# Patient Record
Sex: FEMALE | Race: WHITE | NOT HISPANIC OR LATINO | Employment: OTHER | URBAN - METROPOLITAN AREA
[De-identification: names, ages, dates, MRNs, and addresses within clinical notes are randomized per-mention and may not be internally consistent; named-entity substitution may affect disease eponyms.]

---

## 2017-01-06 DIAGNOSIS — E11.9 TYPE 2 DIABETES MELLITUS WITHOUT COMPLICATION: ICD-10-CM

## 2017-01-13 ENCOUNTER — TELEPHONE (OUTPATIENT)
Dept: OPHTHALMOLOGY | Facility: CLINIC | Age: 82
End: 2017-01-13

## 2017-01-13 NOTE — TELEPHONE ENCOUNTER
----- Message from Sary Ruvalcaba sent at 1/13/2017  1:55 PM CST -----  Contact: Pt  Pt has some questions for the nurse regarding her upcoming appt. Pls call pt back at [phone].

## 2017-01-20 DIAGNOSIS — E11.9 TYPE 2 DIABETES MELLITUS WITHOUT COMPLICATION: ICD-10-CM

## 2017-01-26 ENCOUNTER — TELEPHONE (OUTPATIENT)
Dept: OPHTHALMOLOGY | Facility: CLINIC | Age: 82
End: 2017-01-26

## 2017-01-26 NOTE — TELEPHONE ENCOUNTER
I called ms. Nidhi this morning and let it ring over 25 times with no answer or no voicemail.  I will try her back again a little bit later on. KF

## 2017-01-26 NOTE — TELEPHONE ENCOUNTER
Spoke to patient, she wanted to know if she would have a patch on her eye after having lid lesion removed. She wants to drive herself to her appt.Told her that she would likely have a patch. She will bring a .

## 2017-01-26 NOTE — TELEPHONE ENCOUNTER
----- Message from Maureen Long sent at 1/26/2017 10:05 AM CST -----  Contact: pt  Would like to speak with nurse staff again pt states she is schedule for surgery she forgot to ask a questions pls call back today [phone]

## 2017-01-27 ENCOUNTER — PROCEDURE VISIT (OUTPATIENT)
Dept: OPHTHALMOLOGY | Facility: CLINIC | Age: 82
End: 2017-01-27
Payer: MEDICARE

## 2017-01-27 DIAGNOSIS — L82.1 KERATOSIS SEBORRHEICA: ICD-10-CM

## 2017-01-27 DIAGNOSIS — H25.11 NUCLEAR SCLEROTIC CATARACT OF RIGHT EYE: Primary | ICD-10-CM

## 2017-01-27 DIAGNOSIS — H35.30 MACULAR DEGENERATION (SENILE) OF RETINA: ICD-10-CM

## 2017-01-27 DIAGNOSIS — H25.12 NUCLEAR SCLEROTIC CATARACT OF LEFT EYE: ICD-10-CM

## 2017-01-27 DIAGNOSIS — H40.1132 PRIMARY OPEN ANGLE GLAUCOMA OF BOTH EYES, MODERATE STAGE: ICD-10-CM

## 2017-01-27 PROCEDURE — 99499 UNLISTED E&M SERVICE: CPT | Mod: S$GLB,,, | Performed by: OPHTHALMOLOGY

## 2017-01-27 PROCEDURE — 92136 OPHTHALMIC BIOMETRY: CPT | Mod: 26,RT,S$GLB, | Performed by: OPHTHALMOLOGY

## 2017-01-27 RX ORDER — BRIMONIDINE TARTRATE AND TIMOLOL MALEATE 2; 5 MG/ML; MG/ML
SOLUTION OPHTHALMIC
Qty: 10 ML | Refills: 6 | Status: SHIPPED | OUTPATIENT
Start: 2017-01-27 | End: 2017-12-15 | Stop reason: SDUPTHER

## 2017-01-27 RX ORDER — LATANOPROST 50 UG/ML
SOLUTION/ DROPS OPHTHALMIC
Qty: 2.5 ML | Refills: 12 | Status: SHIPPED | OUTPATIENT
Start: 2017-01-27 | End: 2017-12-15 | Stop reason: SDUPTHER

## 2017-01-27 RX ORDER — POLYMYXIN B SULFATE AND TRIMETHOPRIM 1; 10000 MG/ML; [USP'U]/ML
1 SOLUTION OPHTHALMIC 4 TIMES DAILY
Qty: 1 BOTTLE | Refills: 1 | Status: SHIPPED | OUTPATIENT
Start: 2017-01-27 | End: 2017-02-06

## 2017-01-27 RX ORDER — DIFLUPREDNATE OPHTHALMIC 0.5 MG/ML
1 EMULSION OPHTHALMIC 4 TIMES DAILY
Qty: 1 BOTTLE | Refills: 0 | Status: SHIPPED | OUTPATIENT
Start: 2017-01-27 | End: 2017-05-01 | Stop reason: SDUPTHER

## 2017-01-27 NOTE — PROGRESS NOTES
HPI     Patient is here for EFRAIN bui  NS OU  DRY AMD  ERM OS      Latanaprost QHS OU  Combigan BID OU        COAG  NS OU  DRY AMD  ERM OS  Latanaprost QHS OU  Combigan BID OU       Last edited by XU Wang on 1/27/2017  2:01 PM.         Assessment /Plan     For exam results, see Encounter Report.      ICD-10-CM ICD-9-CM    1. Nuclear sclerotic cataract of right eye H25.11 366.16 Visually Significant Cataract OD > OS  Patient reports decreased vision consistent with the clinical amount of lenticular opacity, which reaches the level of visual significance and affects activities of daily living including reading and glare. Risks, benefits, and alternatives to cataract surgery were discussed.   The pt expressed a desire to proceed with surgery with the potential for some reasonable degree of visual improvement.    Discussed IOL options and refractive outcomes for this patient.    Phaco right eye, with ORA / Istent- Goniotomy   Block  toric IOL.  Will aim for distance  Referral to [facility] for Ophthalmic surgery  Prescriptions sent for preoperative medications  Durezol, Polytrim, and Ilevro  Explained that patient may need glasses after surgery.  Discussed that vision may be limited by retina       IOL Master - OS - Left Eye      Ambulatory Referral to External Surgery      polymyxin B sulf-trimethoprim (POLYTRIM) 10,000 unit- 1 mg/mL Drop      nepafenac (ILEVRO) 0.3 % DrpS      difluprednate (DUREZOL) 0.05 % Drop ophthalmic solution   2. Primary open angle glaucoma of both eyes, moderate stage H40.1132 365.11 Discussed with patient treatment options for glaucoma in conjunction with cataract surgery. Patient is currently treating glaucoma with topical medications and reports significant complaints regarding the tolerability of the medications with respect to cost and irritation. Specifically, the patient complains of redness, irritation, chronic discomfort as well as a financial burden  associated with the use of glaucoma medications. Discussed treatment options including ECP, filtering procedures, iStent, canaloplasty, Goniotomy or the continued use of glaucoma medications. Patient desires the undergo istent/ goniotomy  in conjunction with cataract surgery in order to reduce dependence of glaucoma medications.      brimonidine-timolol (COMBIGAN) 0.2-0.5 % Drop     365.72 latanoprost 0.005 % ophthalmic solution   3. Nuclear sclerotic cataract of left eye H25.12 366.16 IOL Master - OS - Left Eye      Ambulatory Referral to External Surgery      polymyxin B sulf-trimethoprim (POLYTRIM) 10,000 unit- 1 mg/mL Drop      nepafenac (ILEVRO) 0.3 % DrpS      difluprednate (DUREZOL) 0.05 % Drop ophthalmic solution   4. Keratosis seborrheica L82.1 702.19 Right eye, will follow for now- will address cataract first    5. Macular degeneration (senile) of retina H35.30 362.50 Follow -  Appears stable

## 2017-01-30 ENCOUNTER — TELEPHONE (OUTPATIENT)
Dept: OPHTHALMOLOGY | Facility: CLINIC | Age: 82
End: 2017-01-30

## 2017-01-30 NOTE — TELEPHONE ENCOUNTER
----- Message from Jenna Guidry sent at 1/30/2017  4:04 PM CST -----  Contact: Patient  Patient called to speak with the nurse; she can be contacted at [phone].    Thanks,  Jenna

## 2017-01-31 NOTE — TELEPHONE ENCOUNTER
----- Message from Eri Whitt sent at 1/30/2017  4:44 PM CST -----  Contact: Pt   Pt called and stated she needed to speak to the nurse. She stated she needs clarity on her  Procedure date. She can be reached at [phone].    Thanks,  TF

## 2017-02-01 ENCOUNTER — TELEPHONE (OUTPATIENT)
Dept: OPHTHALMOLOGY | Facility: CLINIC | Age: 82
End: 2017-02-01

## 2017-02-01 NOTE — TELEPHONE ENCOUNTER
----- Message from Maureen Long sent at 2/1/2017  2:26 PM CST -----  Contact: pt  Had a couple of questions before starting drops did not know name of drops would like to be called back asap states she will be leaving home in about 15min [phone]

## 2017-02-02 ENCOUNTER — PATIENT OUTREACH (OUTPATIENT)
Dept: ADMINISTRATIVE | Facility: HOSPITAL | Age: 82
End: 2017-02-02

## 2017-02-02 NOTE — LETTER
February 2, 2017    Martha Terrell  [address]  [address]  [place] [address]             [facility]  [address]  [facility] [address]  Phone: [phone] Dear Ms. Terrell:    [facility] is committed to your overall health.  To help you get the most out of each of your visits, we will review your information to make sure you are up to date on all of your recommended tests and/or procedures.      Nichelle Cruz MD has found that you may be due for    Lipid Panel     DEXA SCAN     Hemoglobin A1c     Urine Microalbumin     Foot Exam         If you have had any of the above done at another facility, please bring the records or information with you so that your record at [facility] will be complete.    If you are currently taking medication, please bring it with you to your appointment for review.    We will be happy to assist you with scheduling any necessary appointments or you may contact the [facility] appointment desk at [phone] to schedule at your convenience.     Thank you for choosing [facility] for your healthcare needs,      ROSE MARIE Mclean  Care Coordination Department  [facility]

## 2017-02-07 ENCOUNTER — TELEPHONE (OUTPATIENT)
Dept: OPHTHALMOLOGY | Facility: CLINIC | Age: 82
End: 2017-02-07

## 2017-02-07 NOTE — TELEPHONE ENCOUNTER
----- Message from Maureen Long sent at 2/6/2017  2:11 PM CST -----  Contact: pt  States that she would like to speak to someone regarding if her ins will cover surgery. Please call back at [phone]//thank you

## 2017-02-08 ENCOUNTER — LAB VISIT (OUTPATIENT)
Dept: LAB | Facility: HOSPITAL | Age: 82
End: 2017-02-08
Attending: INTERNAL MEDICINE
Payer: MEDICARE

## 2017-02-08 ENCOUNTER — OFFICE VISIT (OUTPATIENT)
Dept: INTERNAL MEDICINE | Facility: CLINIC | Age: 82
End: 2017-02-08
Payer: MEDICARE

## 2017-02-08 VITALS
OXYGEN SATURATION: 95 % | TEMPERATURE: 97 F | SYSTOLIC BLOOD PRESSURE: 112 MMHG | WEIGHT: 104.94 LBS | BODY MASS INDEX: 19.81 KG/M2 | HEART RATE: 70 BPM | HEIGHT: 61 IN | DIASTOLIC BLOOD PRESSURE: 68 MMHG

## 2017-02-08 DIAGNOSIS — E11.9 CONTROLLED TYPE 2 DIABETES MELLITUS WITHOUT COMPLICATION, UNSPECIFIED LONG TERM INSULIN USE STATUS: Chronic | ICD-10-CM

## 2017-02-08 DIAGNOSIS — E78.5 HYPERLIPIDEMIA ASSOCIATED WITH TYPE 2 DIABETES MELLITUS: ICD-10-CM

## 2017-02-08 DIAGNOSIS — E11.69 HYPERLIPIDEMIA ASSOCIATED WITH TYPE 2 DIABETES MELLITUS: ICD-10-CM

## 2017-02-08 DIAGNOSIS — R35.0 URINARY FREQUENCY: ICD-10-CM

## 2017-02-08 DIAGNOSIS — I11.0 HYPERTENSIVE HEART DISEASE WITH HEART FAILURE: Chronic | ICD-10-CM

## 2017-02-08 DIAGNOSIS — Z00.00 ENCOUNTER FOR PREVENTIVE HEALTH EXAMINATION: ICD-10-CM

## 2017-02-08 DIAGNOSIS — M81.0 OP (OSTEOPOROSIS): ICD-10-CM

## 2017-02-08 DIAGNOSIS — F41.8 MIXED ANXIETY AND DEPRESSIVE DISORDER: ICD-10-CM

## 2017-02-08 DIAGNOSIS — R19.7 DIARRHEA, UNSPECIFIED TYPE: ICD-10-CM

## 2017-02-08 DIAGNOSIS — K59.09 OTHER CONSTIPATION: ICD-10-CM

## 2017-02-08 DIAGNOSIS — M81.0 OP (OSTEOPOROSIS): Primary | ICD-10-CM

## 2017-02-08 DIAGNOSIS — I70.0 ATHEROSCLEROSIS OF AORTA: ICD-10-CM

## 2017-02-08 LAB
25(OH)D3+25(OH)D2 SERPL-MCNC: 45 NG/ML
ALBUMIN SERPL BCP-MCNC: 3.8 G/DL
ALP SERPL-CCNC: 64 U/L
ALT SERPL W/O P-5'-P-CCNC: 18 U/L
ANION GAP SERPL CALC-SCNC: 7 MMOL/L
AST SERPL-CCNC: 21 U/L
BASOPHILS # BLD AUTO: 0.04 K/UL
BASOPHILS NFR BLD: 0.5 %
BILIRUB SERPL-MCNC: 0.5 MG/DL
BUN SERPL-MCNC: 18 MG/DL
CALCIUM SERPL-MCNC: 9.3 MG/DL
CHLORIDE SERPL-SCNC: 99 MMOL/L
CO2 SERPL-SCNC: 29 MMOL/L
CREAT SERPL-MCNC: 0.8 MG/DL
DIFFERENTIAL METHOD: ABNORMAL
EOSINOPHIL # BLD AUTO: 0.1 K/UL
EOSINOPHIL NFR BLD: 1.8 %
ERYTHROCYTE [DISTWIDTH] IN BLOOD BY AUTOMATED COUNT: 14.9 %
EST. GFR  (AFRICAN AMERICAN): >60 ML/MIN/1.73 M^2
EST. GFR  (NON AFRICAN AMERICAN): >60 ML/MIN/1.73 M^2
GLUCOSE SERPL-MCNC: 106 MG/DL
HCT VFR BLD AUTO: 39.9 %
HGB BLD-MCNC: 12.8 G/DL
LYMPHOCYTES # BLD AUTO: 1.7 K/UL
LYMPHOCYTES NFR BLD: 21 %
MCH RBC QN AUTO: 29.9 PG
MCHC RBC AUTO-ENTMCNC: 32.1 %
MCV RBC AUTO: 93 FL
MONOCYTES # BLD AUTO: 0.6 K/UL
MONOCYTES NFR BLD: 7.1 %
NEUTROPHILS # BLD AUTO: 5.4 K/UL
NEUTROPHILS NFR BLD: 69 %
PLATELET # BLD AUTO: 235 K/UL
PMV BLD AUTO: 10.9 FL
POTASSIUM SERPL-SCNC: 4.3 MMOL/L
PROT SERPL-MCNC: 7.2 G/DL
RBC # BLD AUTO: 4.28 M/UL
SODIUM SERPL-SCNC: 135 MMOL/L
TSH SERPL DL<=0.005 MIU/L-ACNC: 1.18 UIU/ML
WBC # BLD AUTO: 7.87 K/UL

## 2017-02-08 PROCEDURE — 99999 PR PBB SHADOW E&M-EST. PATIENT-LVL III: CPT | Mod: PBBFAC,,, | Performed by: INTERNAL MEDICINE

## 2017-02-08 PROCEDURE — 99499 UNLISTED E&M SERVICE: CPT | Mod: S$GLB,,, | Performed by: INTERNAL MEDICINE

## 2017-02-08 PROCEDURE — 36415 COLL VENOUS BLD VENIPUNCTURE: CPT | Mod: PO

## 2017-02-08 PROCEDURE — 84443 ASSAY THYROID STIM HORMONE: CPT

## 2017-02-08 PROCEDURE — 85025 COMPLETE CBC W/AUTO DIFF WBC: CPT

## 2017-02-08 PROCEDURE — 82306 VITAMIN D 25 HYDROXY: CPT

## 2017-02-08 PROCEDURE — 99397 PER PM REEVAL EST PAT 65+ YR: CPT | Mod: S$GLB,,, | Performed by: INTERNAL MEDICINE

## 2017-02-08 PROCEDURE — 83036 HEMOGLOBIN GLYCOSYLATED A1C: CPT

## 2017-02-08 PROCEDURE — 80053 COMPREHEN METABOLIC PANEL: CPT

## 2017-02-08 RX ORDER — HYDROXYZINE HYDROCHLORIDE 10 MG/1
10-20 TABLET, FILM COATED ORAL 3 TIMES DAILY PRN
Qty: 40 TABLET | Refills: 3 | Status: SHIPPED | OUTPATIENT
Start: 2017-02-08 | End: 2018-03-29

## 2017-02-08 RX ORDER — HYDROXYZINE HYDROCHLORIDE 10 MG/1
10-20 TABLET, FILM COATED ORAL 3 TIMES DAILY PRN
Qty: 40 TABLET | Refills: 3 | Status: SHIPPED | OUTPATIENT
Start: 2017-02-08 | End: 2017-02-08 | Stop reason: SDUPTHER

## 2017-02-08 NOTE — PROGRESS NOTES
"Subjective:       Patient ID: Martha Terrell is a 89 y.o. female.    Chief Complaint: Annual Exam    HPI Comments: Here for f/u medical problems and preventive exam.  Tolerating sertraline 75mg and thinks helping her.  Urine frequency at night, causes her not to sleep much.  Detrol is not helping.  Constipation alt with diarrhea.  No black or blood in stool.  No f/c/sw.  Having some throat tickle and coughing.  Wants allergy pill.  No exertional cp/sob/palp.  Taking vit D.  No dysuria.  Wants a pill for occasional excess anxiety feeling.        HM: 10/16 fluvax, 5/15 [id number], 10/16 booster [id number], 10/13 TDaP, 2/12 zoster, 4/13 BMD, 4/06 Cscope no repeat.        Review of Systems   Constitutional: Negative for appetite change, chills, diaphoresis and fever.   HENT: Negative for congestion, ear pain, rhinorrhea, sinus pressure and sore throat.    Respiratory: Negative for cough, chest tightness and shortness of breath.    Cardiovascular: Negative for chest pain and palpitations.   Gastrointestinal: Positive for constipation and diarrhea. Negative for blood in stool, nausea and vomiting.   Genitourinary: Positive for frequency. Negative for dysuria, hematuria, menstrual problem, urgency and vaginal discharge.   Musculoskeletal: Negative for arthralgias.   Skin: Negative for rash.   Neurological: Negative for dizziness and headaches.   Psychiatric/Behavioral: Negative for sleep disturbance. The patient is not nervous/anxious.        Objective:     Visit Vitals    /68 (BP Location: Right arm)    Pulse 70    Temp 96.7 °F (35.9 °C) (Tympanic)    Ht 5' 1" (1.549 m)    Wt 47.6 kg (104 lb 15 oz)    SpO2 95%    BMI 19.83 kg/m2       Physical Exam   Constitutional: She is oriented to person, place, and time. She appears well-developed and well-nourished.   HENT:   Right Ear: External ear normal. Tympanic membrane is not injected.   Left Ear: External ear normal. Tympanic membrane is not injected.   Mouth/Throat: " Oropharynx is clear and moist.   Eyes: Conjunctivae are normal.   Neck: Normal range of motion. Neck supple. No thyromegaly present.   Cardiovascular: Normal rate, regular rhythm and intact distal pulses.  Exam reveals no gallop and no friction rub.    No murmur heard.  Pulses:       Dorsalis pedis pulses are 2+ on the right side, and 2+ on the left side.        Posterior tibial pulses are 2+ on the right side, and 2+ on the left side.   Pulmonary/Chest: Effort normal and breath sounds normal. She has no wheezes. She has no rales.   Abdominal: Soft. Bowel sounds are normal. She exhibits no mass. There is no tenderness.   Musculoskeletal: She exhibits no edema.   Feet:   Right Foot:   Protective Sensation: 10 sites tested. 10 sites sensed.   Skin Integrity: Negative for ulcer, blister, skin breakdown, erythema, warmth, callus or dry skin.   Left Foot:   Protective Sensation: 10 sites tested. 10 sites sensed.   Skin Integrity: Negative for ulcer, blister, skin breakdown, erythema, warmth, callus or dry skin.   Lymphadenopathy:     She has no cervical adenopathy.   Neurological: She is alert and oriented to person, place, and time.   Skin: Skin is warm. No rash noted.   Psychiatric: She has a normal mood and affect.       Assessment:       1. OP (osteoporosis)    2. Mixed anxiety and depressive disorder    3. Hypertensive heart disease with heart failure    4. Hyperlipidemia associated with type 2 diabetes mellitus    5. Controlled type 2 diabetes mellitus without complication, unspecified long term insulin use status    6. Atherosclerosis of aorta    7. Encounter for preventive health examination    8. Urinary frequency    9. Other constipation    10. Diarrhea, unspecified type        Plan:       Martha was seen today for annual exam.    Diagnoses and all orders for this visit:    OP (osteoporosis)  -     Vitamin D; Future    Mixed anxiety and depressive disorder- cont zoloft.  Add hydroxyzine prn.    Hypertensive  heart disease with heart failure- clin stable.    Hyperlipidemia associated with type 2 diabetes mellitus- too old to start statin.    Controlled type 2 diabetes mellitus without complication, unspecified long term insulin use status- check lab.  -     Hemoglobin A1c; Future  -     Microalbumin/creatinine urine ratio; Future    Atherosclerosis of aorta due to age.    Encounter for preventive health examination- lab now with [place].  -     Comprehensive metabolic panel; Future  -     CBC auto differential; Future  -     TSH; Future  -     Vitamin D; Future  -     Hemoglobin A1c; Future  -     Microalbumin/creatinine urine ratio; Future    Urinary frequency, not better with detrol  -     Ambulatory consult to Urology  -     Urinalysis; Future    Other constipation and Diarrhea, unspecified type  -     Ambulatory consult to Gastroenterology

## 2017-02-08 NOTE — MR AVS SNAPSHOT
[facility]  [address] [facility] [place] [address]  Phone: [phone]  Fax: [phone]                  Martha Terrell   2017 11:00 AM   Office Visit    Description:  Female : 1921   Provider:  Nichelle Lugo MD   Department:  [facility] - Internal Medicine           Reason for Visit     Annual Exam           Diagnoses this Visit        Comments    OP (osteoporosis)    -  Primary     Mixed anxiety and depressive disorder         Hypertensive heart disease with heart failure         Hyperlipidemia associated with type 2 diabetes mellitus         Controlled type 2 diabetes mellitus without complication, unspecified long term insulin use status         Atherosclerosis of aorta         Encounter for preventive health examination         Urinary frequency         Other constipation         Diarrhea, unspecified type                To Do List           Future Appointments        Provider Department Dept Phone    2017 2:00 PM LAB, SAME DAY [facility] [phone]    2017 2:30 PM SPECIMEN, [facility] [phone]    2017 1:00 PM Glenn Payan MD [facility] [phone]    2017 10:40 AM Megha Herrera [facility] [phone]    3/21/2017 4:20 PM Ranjeet Robles IV, MD [facility] [phone]      Goals (5 Years of Data)     None      Follow-Up and Disposition     Return in about 4 months (around 2017).      [facility]     [facility] Nurse Care Line - 24/7 Assistance  Registered nurses in the [facility] provide clinical advisement, health education, appointment booking, and other advisory services.  Call for this free service at [phone].             Medications           Message regarding Medications     Verify the changes and/or additions to your medication regime listed below are the same as discussed with your clinician today.  If any of these  "changes or additions are incorrect, please notify your healthcare provider.        STOP taking these medications     lorazepam (ATIVAN) 1 MG tablet TAKE ONE TABLET BY MOUTH IN THE EVENING            Verify that the below list of medications is an accurate representation of the medications you are currently taking.  If none reported, the list may be blank. If incorrect, please contact your healthcare provider. Carry this list with you in case of emergency.           Current Medications     ascorbic acid (VITAMIN C) 500 MG tablet Take 500 mg by mouth once daily.    brimonidine-timolol (COMBIGAN) 0.2-0.5 % Drop instill 1 drop into each eye twice daily    calcium-vitamin D tablet 600 mg-200 units Take 1 tablet by mouth once daily.    difluprednate (DUREZOL) 0.05 % Drop ophthalmic solution Place 1 drop into the right eye 4 (four) times daily.    latanoprost 0.005 % ophthalmic solution INSTILL 1 DROP INTO EACH EYE IN THE EVENING    lutein 6 mg Cap Take by mouth.    meclizine (ANTIVERT) 12.5 mg tablet Take 1 tablet (12.5 mg total) by mouth 3 (three) times daily as needed.    multivitamin capsule Take 1 capsule by mouth once daily.    nepafenac (ILEVRO) 0.3 % DrpS Place 1 drop into the right eye once daily.    sertraline (ZOLOFT) 50 MG tablet Take 1.5 tablets (75 mg total) by mouth every evening.    tolterodine (DETROL LA) 4 MG 24 hr capsule TAKE ONE CAPSULE BY MOUTH ONE TIME DAILY     vit C-vit E-copper-ZnOx-lutein 226-200-5-0.8 mg-unit-mg-mg Cap Take 1 capsule by mouth once daily.    VITAMIN B COMP W-C/ZINC (B COMPLEX-C-E-ZN) CpSR Take 1 capsule by mouth.    vitamin E 400 UNIT capsule Take 400 Units by mouth once daily.           Clinical Reference Information           Your Vitals Were     BP Pulse Temp Height Weight SpO2    112/68 (BP Location: Right arm) 70 96.7 °F (35.9 °C) (Tympanic) 5' 1" (1.549 m) 47.6 kg (104 lb 15 oz) 95%    BMI                19.83 kg/m2          Blood Pressure          Most Recent Value    BP  " 112/68      Allergies as of 2/8/2017     Amlodipine    Benazepril    Dyazide  [Triamterene-hydrochlorothiazid]      Immunizations Administered on Date of Encounter - 2/8/2017     None      Orders Placed During Today's Visit      Normal Orders This Visit    Ambulatory consult to Gastroenterology     Ambulatory consult to Urology     Future Labs/Procedures Expected by Expires    CBC auto differential  2/8/2017 2/8/2018    Comprehensive metabolic panel  2/8/2017 2/8/2018    Hemoglobin A1c  2/8/2017 4/9/2018    Microalbumin/creatinine urine ratio  2/8/2017 4/9/2018    TSH  2/8/2017 2/8/2018    Urinalysis  2/8/2017 5/9/2017    Vitamin D  As directed 2/8/2018      [facility] Sign-Up     Activating your [facility] account is as easy as 1-2-3!     1) Visit [email or web address], select Sign Up Now, enter this activation code and your date of birth, then select Next.  [email or web address]  Expires: 3/25/2017 10:57 AM      2) Create a username and password to use when you visit [facility] in the future and select a security question in case you lose your password and select Next.    3) Enter your e-mail address and click Sign Up!    Additional Information  If you have questions, please e-mail [email or web address] or call [phone] to talk to our [facility] staff. Remember, [facility] is NOT to be used for urgent needs. For medical emergencies, dial 911.         Language Assistance Services     ATTENTION: Language assistance services are available, free of charge. Please call [phone].      ATENCIÓN: Si habla español, tiene a graves disposición servicios gratuitos de asistencia lingüística. Llame al [phone].     CHÚ Ý: N?u b?n nói Ti?ng Vi?t, có các d?ch v? h? tr? ngôn ng? mi?n phí dành cho b?n. G?i s? [phone].         [facility] complies with applicable Federal civil rights laws and does not discriminate on the basis of race, color, national origin, age, disability, or sex.

## 2017-02-09 ENCOUNTER — TELEPHONE (OUTPATIENT)
Dept: INTERNAL MEDICINE | Facility: CLINIC | Age: 82
End: 2017-02-09

## 2017-02-09 LAB
ESTIMATED AVG GLUCOSE: 123 MG/DL
HBA1C MFR BLD HPLC: 5.9 %

## 2017-02-10 ENCOUNTER — TELEPHONE (OUTPATIENT)
Dept: OPHTHALMOLOGY | Facility: CLINIC | Age: 82
End: 2017-02-10

## 2017-02-10 NOTE — TELEPHONE ENCOUNTER
----- Message from Casandra Pabon sent at 2/10/2017  2:59 PM CST -----  Contact: pt   Call pt regarding what time she need to go in for her surgery.   [phone] (home)

## 2017-02-10 NOTE — TELEPHONE ENCOUNTER
Called pt and let her know that I do not have her time on hand, but that she will receive a call the day before with her time.

## 2017-02-21 ENCOUNTER — TELEPHONE (OUTPATIENT)
Dept: OPHTHALMOLOGY | Facility: CLINIC | Age: 82
End: 2017-02-21

## 2017-02-21 NOTE — TELEPHONE ENCOUNTER
----- Message from Maureen Long sent at 2/21/2017  1:56 PM CST -----  Contact: pt  [phone] would like to speak with staff did not care to leave any details thx stacie

## 2017-03-06 ENCOUNTER — TELEPHONE (OUTPATIENT)
Dept: OPHTHALMOLOGY | Facility: CLINIC | Age: 82
End: 2017-03-06

## 2017-03-06 NOTE — TELEPHONE ENCOUNTER
----- Message from Sary Hurt sent at 3/6/2017  2:05 PM CST -----  Contact: pt  Pt calling to speak to nurse (Ester)...states that has questions/ concerns about upcoming surgery and medications she is taking....please adv/call pt back at [phone]///thx jw

## 2017-03-07 ENCOUNTER — TELEPHONE (OUTPATIENT)
Dept: OPHTHALMOLOGY | Facility: CLINIC | Age: 82
End: 2017-03-07

## 2017-03-07 NOTE — TELEPHONE ENCOUNTER
----- Message from Maureen Long sent at 3/7/2017  4:22 PM CST -----  Contact: pt  [phone] wants to speak with staff about very important questions before surgery pls call today .did not care to leave any further details

## 2017-03-07 NOTE — TELEPHONE ENCOUNTER
----- Message from Sharon Long sent at 3/7/2017  4:53 PM CST -----  Patient calling to speak to nurse again. Please adv/call [phone].//thanks. cw

## 2017-03-08 NOTE — TELEPHONE ENCOUNTER
----- Message from Maureen Long sent at 3/8/2017  9:39 AM CST -----  Contact: pt  Pt is requesting to speak with nurse regarding questions about surgery scheduled for 3/9/17. Pls call pt back at [phone]

## 2017-03-08 NOTE — TELEPHONE ENCOUNTER
Spoke with patient and she found her color coded chart for eye drops.  I informed her to try to get all drops in before surgery, if she wakes up early but no to worry if she doesn't have time.

## 2017-03-10 ENCOUNTER — OFFICE VISIT (OUTPATIENT)
Dept: OPHTHALMOLOGY | Facility: CLINIC | Age: 82
End: 2017-03-10
Payer: MEDICARE

## 2017-03-10 DIAGNOSIS — H40.1132 PRIMARY OPEN ANGLE GLAUCOMA OF BOTH EYES, MODERATE STAGE: ICD-10-CM

## 2017-03-10 DIAGNOSIS — Z98.890 POST-OPERATIVE STATE: Primary | ICD-10-CM

## 2017-03-10 DIAGNOSIS — Z98.41 CATARACT EXTRACTION STATUS, RIGHT: ICD-10-CM

## 2017-03-10 PROCEDURE — 99024 POSTOP FOLLOW-UP VISIT: CPT | Mod: S$GLB,,, | Performed by: OPHTHALMOLOGY

## 2017-03-10 NOTE — PROGRESS NOTES
HPI     COAG  Phaco OD Toric- Istent     3-   DRY AMD  ERM OS      Latanaprost QHS OU  Combigan BID OU    Durezol, Ilevro, Gati        Last edited by Glenn Payan MD on 3/10/2017 10:13 AM.         Assessment /Plan     For exam results, see Encounter Report.      ICD-10-CM ICD-9-CM    1. Post-operative state Z98.890 V45.89 IMP:  PO  S/P Phaco/I-stent  with IOL right: Doing well.    Plan:  Durezol QID  Ilevro 14 days  D/c Polymyxin after 5 days  Gerardo 1% QID  Reinstructed in importance of absolute compliance with Post-OP instructions including medications, shield at bedtime, and limitation of activities.    RTC in 1 week     2. Primary open angle glaucoma of both eyes, moderate stage H40.1132 365.11      365.72    3. Cataract extraction status, right Z98.41 V45.61          Latanaprost QHS OU  Combigan BID OU

## 2017-03-17 ENCOUNTER — OFFICE VISIT (OUTPATIENT)
Dept: OPHTHALMOLOGY | Facility: CLINIC | Age: 82
End: 2017-03-17
Payer: MEDICARE

## 2017-03-17 DIAGNOSIS — Z98.890 POST-OPERATIVE STATE: Primary | ICD-10-CM

## 2017-03-17 DIAGNOSIS — H40.1132 PRIMARY OPEN ANGLE GLAUCOMA OF BOTH EYES, MODERATE STAGE: ICD-10-CM

## 2017-03-17 DIAGNOSIS — Z98.41 CATARACT EXTRACTION STATUS, RIGHT: ICD-10-CM

## 2017-03-17 PROCEDURE — 99999 PR PBB SHADOW E&M-EST. PATIENT-LVL I: CPT | Mod: PBBFAC,,, | Performed by: OPHTHALMOLOGY

## 2017-03-17 PROCEDURE — 99024 POSTOP FOLLOW-UP VISIT: CPT | Mod: S$GLB,,, | Performed by: OPHTHALMOLOGY

## 2017-03-17 NOTE — PROGRESS NOTES
HPI     S/p Phaco I-STENT OD +21.0 SN6AT5 / CDE 12.60 3-.  Pt states   vision isn't that much improved OD.    COAG  Phaco I-STENT OD +21.0 SN6AT5 / CDE 12.60 3-   DRY AMD  ERM OS      Latanaprost QHS OU  Combigan BID OU    OD Durezol qid, Ilevro 1qd, Gati  Qid        Last edited by Mary Grace Cleaning on 3/17/2017 11:37 AM.         Assessment /Plan     For exam results, see Encounter Report.      ICD-10-CM ICD-9-CM    1. Post-operative state Z98.890 V45.89 PO Week 1 S/P Phaco/ IOL right eye with Istent :   Doing well with no evidence of infection or abnormal inflammation.     D/C antibiotic drops  Taper Durezol weekly per instruction sheet.  Ilevro daily 7 more days.  Resume normal activitites and d/c eye shield.  OTC reading glasses can be used until evaluated for final MR.  D/c Shield at night    RTC 4 weeks or PRN pain, redness, vision loss, or other concerns.          2. Cataract extraction status, right Z98.41 V45.61    3. Primary open angle glaucoma of both eyes, moderate stage H40.1132 365.11      365.72        Latanaprost QHS OU  Combigan BID OU    Use +2.25 Readers for painting

## 2017-03-28 ENCOUNTER — OFFICE VISIT (OUTPATIENT)
Dept: OPHTHALMOLOGY | Facility: CLINIC | Age: 82
End: 2017-03-28
Payer: MEDICARE

## 2017-03-28 DIAGNOSIS — H25.12 NUCLEAR SCLEROTIC CATARACT OF LEFT EYE: ICD-10-CM

## 2017-03-28 DIAGNOSIS — H40.1132 PRIMARY OPEN ANGLE GLAUCOMA OF BOTH EYES, MODERATE STAGE: ICD-10-CM

## 2017-03-28 DIAGNOSIS — Z98.890 POST-OPERATIVE STATE: Primary | ICD-10-CM

## 2017-03-28 PROCEDURE — 99024 POSTOP FOLLOW-UP VISIT: CPT | Mod: S$GLB,,, | Performed by: OPHTHALMOLOGY

## 2017-03-28 PROCEDURE — 99999 PR PBB SHADOW E&M-EST. PATIENT-LVL I: CPT | Mod: PBBFAC,,, | Performed by: OPHTHALMOLOGY

## 2017-03-28 NOTE — PROGRESS NOTES
HPI     Patient is here for an 11 day rtc p.o. Visit OD  COAG  Phaco I-STENT OD +21.0 SN6AT5 / CDE 12.60 3-   DRY AMD  ERM OS      Latanaprost QHS OU  Combigan BID OU    OD Durezol BID, Ilevro 1qd,           Last edited by Glenn Payan MD on 3/28/2017 12:13 PM.         Assessment /Plan     For exam results, see Encounter Report.      ICD-10-CM ICD-9-CM    1. Post-operative state Z98.890 V45.89 PO Week 1 S/P Phaco/ IOL right eye:   Doing well with no evidence of infection or abnormal inflammation.     D/C antibiotic drops  Taper Durezol weekly per instruction sheet.  Ilevro daily 7 more days.  Resume normal activitites and d/c eye shield.  OTC reading glasses can be used until evaluated for final MR.  D/c Shield at night              2. Primary open angle glaucoma of both eyes, moderate stage H40.1132 365.11      365.72    3. Nuclear sclerotic cataract of left eye H25.12 366.16        RETURN TO CLINIC 4 weeks and refract ou   Pt states she wants a pair of RX glasses to cover her eyelid bumps/ bags    Use +2.50 OTC for painting       Latanaprost QHS OU  Combigan BID OU    OD Durezol QD for 1 week then stop , Ilevro 1qd until out     We May stop Glaucoma drops after next visit, will continue for now

## 2017-04-05 ENCOUNTER — TELEPHONE (OUTPATIENT)
Dept: OPHTHALMOLOGY | Facility: CLINIC | Age: 82
End: 2017-04-05

## 2017-04-05 NOTE — TELEPHONE ENCOUNTER
I spoke with Mrs. Princee and informed her Dr. Payan would like her to stop the Durezol if she is out of the drops. Martha expressed understanding and will call us if she has any other questions.

## 2017-04-05 NOTE — TELEPHONE ENCOUNTER
----- Message from Maureen Long sent at 4/5/2017  9:12 AM CDT -----  Contact: pt  Would like to speak with nurse staff about getting refill on Durezol pt states she is out did not know if she still need this eyedrops pls advise pharmacy [facility] at [phone]  [phone]

## 2017-04-12 ENCOUNTER — OFFICE VISIT (OUTPATIENT)
Dept: ALLERGY | Facility: CLINIC | Age: 82
End: 2017-04-12
Payer: MEDICARE

## 2017-04-12 VITALS
BODY MASS INDEX: 20.47 KG/M2 | WEIGHT: 108.44 LBS | HEART RATE: 74 BPM | SYSTOLIC BLOOD PRESSURE: 100 MMHG | HEIGHT: 61 IN | RESPIRATION RATE: 15 BRPM | TEMPERATURE: 98 F | DIASTOLIC BLOOD PRESSURE: 68 MMHG

## 2017-04-12 DIAGNOSIS — J30.89 ALLERGIC RHINITIS DUE TO OTHER ALLERGEN: Primary | ICD-10-CM

## 2017-04-12 DIAGNOSIS — J31.0 CHRONIC RHINITIS: ICD-10-CM

## 2017-04-12 DIAGNOSIS — R05.9 COUGH: ICD-10-CM

## 2017-04-12 PROCEDURE — 99999 PR PBB SHADOW E&M-EST. PATIENT-LVL IV: CPT | Mod: PBBFAC,,, | Performed by: ALLERGY & IMMUNOLOGY

## 2017-04-12 PROCEDURE — 1157F ADVNC CARE PLAN IN RCRD: CPT | Mod: S$GLB,,, | Performed by: ALLERGY & IMMUNOLOGY

## 2017-04-12 PROCEDURE — 1160F RVW MEDS BY RX/DR IN RCRD: CPT | Mod: S$GLB,,, | Performed by: ALLERGY & IMMUNOLOGY

## 2017-04-12 PROCEDURE — 1159F MED LIST DOCD IN RCRD: CPT | Mod: S$GLB,,, | Performed by: ALLERGY & IMMUNOLOGY

## 2017-04-12 PROCEDURE — 99204 OFFICE O/P NEW MOD 45 MIN: CPT | Mod: S$GLB,,, | Performed by: ALLERGY & IMMUNOLOGY

## 2017-04-12 RX ORDER — MONTELUKAST SODIUM 10 MG/1
10 TABLET ORAL DAILY
Qty: 30 TABLET | Refills: 1 | Status: SHIPPED | OUTPATIENT
Start: 2017-04-12 | End: 2017-05-12

## 2017-04-12 NOTE — PROGRESS NOTES
Chief complaint nasal symptoms, coughing,    This note was dictated using voice recognition software and may contain errors.    History:    She had a 1 PM appointment on Wednesday, April 12, 2017.  Information in her medical record regarding her past medical history family history and social history was reviewed and updated today.  Significant additions if any are as noted below.    She had an appointment with me once in the past.  This occurred August 18, 2008.  While she was here today I read and reviewed and updated my note from 2008.  Significant additions if any are as noted below.  August 2008 was my last contact with her.    She scheduled her appointment today because of troublesome nasal symptoms including sneezing and excessive nasal mucus production.  She also experiences a cough.  The cough may be more pronounced at night.  Sometimes the cough can be productive of a minimal amount of sputum.    Information in her medical record regarding her past medical history family history and social history was reviewed and updated today.  Significant additions if any are as noted above or below.    She does not believe that she is ALLERGIC to any foods or animals.  She lived in [place] for 40 years.  She moved to the [facility] after [place] in 2005.  She stated that she is lived in the same apartment for the past 10 years.    There is a family history of rhinitis.  There is no history of asthma.    Review of systems: See above.  No additional symptoms are mentioned at the time of her appointment this afternoon.    Exam: In general she is in no distress.  She is alert and oriented well-developed in good mood well-groomed and attentive  Gait steady  Skin no rash noted  Head no swelling noted  Eyes sclera white conjunctiva pink  Nose patent pink mucosa clear secretions no polyps seen years not inflamed tympanic membranes not inflamed  Mouth no swelling or inflammation of the lips tongue or in the  throat noted  Neck no thyromegaly or masses noted  Lymph nodes no significant cervical or epitrochlear lymphadenopathy noted  Lungs clear to auscultation  Heart regular rhythm no murmurs heard  Extremities no swelling or inflammation of the hands or legs noted    Impression:    #1 chronic rhinitis  #2 cough, exact cause uncertain  #3 other health concerns as noted in her medical record    Assessment and plan:    Her appointment was 50 minutes in duration spent entirely in face-to-face contact.  More than 50% of the visit was spent in counseling and coordination of care.  Treatment options were discussed.  She is elected to evaluate Singulair 10 mg 1 pill once a day.  I suggested that she call in 3 or 4 weeks and inform me as to whether or not Singulair was helpful.  A prescription was issued at her request and was transmitted electronically to her pharmacy.    She was given the office phone number.  Should she have additional questions or concerns she was instructed to call.    Should she continued to be symptomatic I told her it would be appropriate to recommend that she have a chest x-ray performed.  I reviewed with her, it does seem in reviewing her record that her last chest x-ray here was most likely performed and he September 2013.  A CT scan of the head was performed in January 2016.  The visualized paranasal sinuses were noted to be normal in appearance at that time.  Should she continued to be symptomatic I told her that consideration of performing sinus x-rays would also be appropriate to consider.    We reviewed various causes of coughing.  I told her it would be important not to overlooked sinus disease or new chest concern as a possible reason for her cough.

## 2017-04-13 ENCOUNTER — TELEPHONE (OUTPATIENT)
Dept: INTERNAL MEDICINE | Facility: CLINIC | Age: 82
End: 2017-04-13

## 2017-04-13 RX ORDER — TOLTERODINE 4 MG/1
4 CAPSULE, EXTENDED RELEASE ORAL DAILY
Qty: 30 CAPSULE | Refills: 9 | Status: SHIPPED | OUTPATIENT
Start: 2017-04-13 | End: 2017-08-16

## 2017-04-13 NOTE — TELEPHONE ENCOUNTER
----- Message from Soy Beaver sent at 4/13/2017 10:06 AM CDT -----  Contact: pt  She's calling in regards to a RX refill for: [place] [address] [facility], ph# [phone], please advise, [phone] (home), please call pt when RX has been sent to the pharmacy...

## 2017-04-13 NOTE — TELEPHONE ENCOUNTER
----- Message from Veronica Pollack sent at 4/13/2017  2:06 PM CDT -----  Pt is returning a call from nurse in regards prescription.            Please call pt back at [phone]

## 2017-04-19 ENCOUNTER — TELEPHONE (OUTPATIENT)
Dept: OPHTHALMOLOGY | Facility: CLINIC | Age: 82
End: 2017-04-19

## 2017-04-19 NOTE — TELEPHONE ENCOUNTER
----- Message from Maureen Long sent at 4/19/2017  2:00 PM CDT -----  Contact: pt  Pt wants to speak to someone in Dr Sims office right away.did not want to leave any details .[phone] (home)

## 2017-04-26 ENCOUNTER — TELEPHONE (OUTPATIENT)
Dept: ALLERGY | Facility: CLINIC | Age: 82
End: 2017-04-26

## 2017-04-26 ENCOUNTER — OFFICE VISIT (OUTPATIENT)
Dept: OPHTHALMOLOGY | Facility: CLINIC | Age: 82
End: 2017-04-26
Payer: MEDICARE

## 2017-04-26 DIAGNOSIS — H25.012 CORTICAL SENILE CATARACT OF LEFT EYE: ICD-10-CM

## 2017-04-26 DIAGNOSIS — Z98.890 POST-OPERATIVE STATE: Primary | ICD-10-CM

## 2017-04-26 DIAGNOSIS — H40.1132 PRIMARY OPEN ANGLE GLAUCOMA OF BOTH EYES, MODERATE STAGE: ICD-10-CM

## 2017-04-26 DIAGNOSIS — Z98.41 CATARACT EXTRACTION STATUS, RIGHT: ICD-10-CM

## 2017-04-26 PROCEDURE — 99999 PR PBB SHADOW E&M-EST. PATIENT-LVL I: CPT | Mod: PBBFAC,,, | Performed by: OPHTHALMOLOGY

## 2017-04-26 PROCEDURE — 99499 UNLISTED E&M SERVICE: CPT | Mod: S$GLB,,, | Performed by: OPHTHALMOLOGY

## 2017-04-26 PROCEDURE — 99024 POSTOP FOLLOW-UP VISIT: CPT | Mod: S$GLB,,, | Performed by: OPHTHALMOLOGY

## 2017-04-26 NOTE — TELEPHONE ENCOUNTER
This note was dictated using voice recognition software and may contain errors.    She called today as requested.  Her appointment with me was on April 12.  She is been taking Singulair 10 mg daily.  She believes that Singulair is helpful.  She is experiencing fewer symptoms.    I requested she call on Wednesday, May 10 and report upon her status.  4 other questions or concerns she may call.

## 2017-04-26 NOTE — PROGRESS NOTES
HPI     Post-op Evaluation    Additional comments: Ilevro QD OD           Glaucoma    Additional comments: Latanaprost QHS OU, Combigan BID OU           Comments   Pt states that there was some haziness in OU. No pain or discomfort. Pt   states she is more than 95% compliant with gtts.  Pt feels the bags on her   eyes came up after cataract surgery OD>OS     COAG  Phaco I-STENT OD +21.0 SN6AT5 / CDE 12.60 3-   DRY AMD  ERM OS      Latanaprost QHS OU  Combigan BID OU       Last edited by Glenn Payan MD on 4/26/2017 12:02 PM. (History)            Assessment /Plan     For exam results, see Encounter Report.      ICD-10-CM ICD-9-CM    1. Post-operative state Z98.890 V45.89 S/p Phaco-Istent Doing well-    2. Cataract extraction status, right Z98.41 V45.61    3. Cortical senile cataract of left eye H25.012 366.15 Pt defers surgery at this time to the left eye      4. Primary open angle glaucoma of both eyes, moderate stage H40.1132 365.11      365  .72      Patient has prominent inferior fat pads and thinks that the cataract surgery caused this. Explained that cataract surgery does not cause this but patient is convinced. Explained that the fat pads could be enlarged due to retention of fluid. Will discuss with PCP the possibility of using a diuretic.    Stop Latanaprost OD only at this time, Use latanoprost QHS OS only  Continue  Combigan BID OU   RETURN TO CLINIC     Disp MR (pt request painting glasses but wants to use a regular bifocal despite recommendations regarding the option of trying painting glasses

## 2017-04-26 NOTE — Clinical Note
Nichelle, This patient underwent a cataract surgery recently and has noticed that she has prominent lower lid fat pads post operatively. This was, of course, present preoperatively but she was unable to see well enough to appreciate this. It is her position that the surgery caused this. I tried to explain to her that cataract surgery does not cause prolapse of infra orbital fat pads. I doubt that she believes me. Never the less, it is possible that a diuretic might help reduce the size of the ft pads as I do appreciate some edema fluid in them. Perhaps you could consider her medical condition and see if that would be in order.\Thanks

## 2017-05-01 DIAGNOSIS — H25.12 NUCLEAR SCLEROTIC CATARACT OF LEFT EYE: ICD-10-CM

## 2017-05-01 DIAGNOSIS — H25.11 NUCLEAR SCLEROTIC CATARACT OF RIGHT EYE: ICD-10-CM

## 2017-05-01 RX ORDER — DUREZOL 0.5 MG/ML
EMULSION OPHTHALMIC
Qty: 5 ML | Refills: 0 | Status: SHIPPED | OUTPATIENT
Start: 2017-05-01 | End: 2017-06-14

## 2017-05-17 ENCOUNTER — TELEPHONE (OUTPATIENT)
Dept: ALLERGY | Facility: CLINIC | Age: 82
End: 2017-05-17

## 2017-05-17 NOTE — TELEPHONE ENCOUNTER
----- Message from Eugenia Plummer sent at 5/17/2017 12:55 PM CDT -----  Call pt at [phone]//pt calling to give report on how she doing on the medication please give her a call//dony ht

## 2017-05-17 NOTE — TELEPHONE ENCOUNTER
This note was dictated using voice recognition software and may contain errors.    She called today as requested.  She stated that her nasal symptoms and coughing he have improved since she began taking Singulair 10 mg 1 pill once a day.  She will obtained a refill on her prescription and continue to take it for the upcoming 4 weeks.  I requested that she call in 3 or 4 weeks and report upon her status.  4 other questions or concerns she may call.

## 2017-06-14 ENCOUNTER — OFFICE VISIT (OUTPATIENT)
Dept: OPHTHALMOLOGY | Facility: CLINIC | Age: 82
End: 2017-06-14
Payer: MEDICARE

## 2017-06-14 ENCOUNTER — OFFICE VISIT (OUTPATIENT)
Dept: INTERNAL MEDICINE | Facility: CLINIC | Age: 82
End: 2017-06-14
Payer: MEDICARE

## 2017-06-14 VITALS
HEIGHT: 60 IN | DIASTOLIC BLOOD PRESSURE: 68 MMHG | TEMPERATURE: 97 F | HEART RATE: 62 BPM | BODY MASS INDEX: 21.68 KG/M2 | WEIGHT: 110.44 LBS | SYSTOLIC BLOOD PRESSURE: 104 MMHG | OXYGEN SATURATION: 96 %

## 2017-06-14 DIAGNOSIS — R35.0 URINARY FREQUENCY: ICD-10-CM

## 2017-06-14 DIAGNOSIS — M54.42 ACUTE LEFT-SIDED LOW BACK PAIN WITH BILATERAL SCIATICA: ICD-10-CM

## 2017-06-14 DIAGNOSIS — M54.41 ACUTE LEFT-SIDED LOW BACK PAIN WITH BILATERAL SCIATICA: ICD-10-CM

## 2017-06-14 DIAGNOSIS — R63.4 WEIGHT LOSS: ICD-10-CM

## 2017-06-14 DIAGNOSIS — Z98.41 CATARACT EXTRACTION STATUS, RIGHT: ICD-10-CM

## 2017-06-14 DIAGNOSIS — H40.1132 PRIMARY OPEN ANGLE GLAUCOMA OF BOTH EYES, MODERATE STAGE: Primary | ICD-10-CM

## 2017-06-14 DIAGNOSIS — F41.8 MIXED ANXIETY AND DEPRESSIVE DISORDER: Primary | ICD-10-CM

## 2017-06-14 DIAGNOSIS — H25.012 CORTICAL SENILE CATARACT OF LEFT EYE: ICD-10-CM

## 2017-06-14 DIAGNOSIS — E11.9 CONTROLLED TYPE 2 DIABETES MELLITUS WITHOUT COMPLICATION, UNSPECIFIED LONG TERM INSULIN USE STATUS: Chronic | ICD-10-CM

## 2017-06-14 PROCEDURE — 99999 PR PBB SHADOW E&M-EST. PATIENT-LVL II: CPT | Mod: PBBFAC,,, | Performed by: OPHTHALMOLOGY

## 2017-06-14 PROCEDURE — 96372 THER/PROPH/DIAG INJ SC/IM: CPT | Mod: S$GLB,,, | Performed by: INTERNAL MEDICINE

## 2017-06-14 PROCEDURE — 99499 UNLISTED E&M SERVICE: CPT | Mod: S$GLB,,, | Performed by: INTERNAL MEDICINE

## 2017-06-14 PROCEDURE — 99999 PR PBB SHADOW E&M-EST. PATIENT-LVL IV: CPT | Mod: PBBFAC,,, | Performed by: INTERNAL MEDICINE

## 2017-06-14 PROCEDURE — 1159F MED LIST DOCD IN RCRD: CPT | Mod: S$GLB,,, | Performed by: INTERNAL MEDICINE

## 2017-06-14 PROCEDURE — 99499 UNLISTED E&M SERVICE: CPT | Mod: S$GLB,,, | Performed by: OPHTHALMOLOGY

## 2017-06-14 PROCEDURE — 99024 POSTOP FOLLOW-UP VISIT: CPT | Mod: S$GLB,,, | Performed by: OPHTHALMOLOGY

## 2017-06-14 PROCEDURE — 1126F AMNT PAIN NOTED NONE PRSNT: CPT | Mod: S$GLB,,, | Performed by: INTERNAL MEDICINE

## 2017-06-14 PROCEDURE — 99214 OFFICE O/P EST MOD 30 MIN: CPT | Mod: 25,S$GLB,, | Performed by: INTERNAL MEDICINE

## 2017-06-14 RX ORDER — TRIAMCINOLONE ACETONIDE 40 MG/ML
40 INJECTION, SUSPENSION INTRA-ARTICULAR; INTRAMUSCULAR
Status: COMPLETED | OUTPATIENT
Start: 2017-06-14 | End: 2017-06-14

## 2017-06-14 RX ADMIN — TRIAMCINOLONE ACETONIDE 40 MG: 40 INJECTION, SUSPENSION INTRA-ARTICULAR; INTRAMUSCULAR at 10:06

## 2017-06-14 NOTE — PROGRESS NOTES
HPI     Post-op Evaluation    Additional comments: 2 month IOP check, Latanoprost QHS OS only, Combigan   BID OU           Comments   Pt states she's been using her drops as directed. No pain or irritation.   Still having some blurriness when reading, have to use readers and a   magnifier. No other ocular complaints today.    COAG  Phaco I-STENT OD +21.0 SN6AT5 / CDE 12.60 3-   DRY AMD  ERM OS      Latanaprost QHS OS  Combigan BID OU       Last edited by Segundo Gonzalez on 6/14/2017  9:35 AM. (History)            Assessment /Plan     For exam results, see Encounter Report.      ICD-10-CM ICD-9-CM    1. Primary open angle glaucoma of both eyes, moderate stage H40.1132 365.11 Doing well - intraocular pressure is within acceptable range relative to target pressure with no evidence of progression.   Continue current treatment.  Reviewed importance of continued compliance with treatment and follow up.        365.72    2. Cataract extraction status, right Z98.41 V45.61 stable   3. Cortical senile cataract of left eye H25.012 366.15 Pt will like to wait elects stronger readers until she is ready     Latanaprost QHS OS  Combigan BID OU    Return to clinic 4 months iop check

## 2017-06-14 NOTE — PROGRESS NOTES
Subjective:       Patient ID: Martha Terrell is a 89 y.o. female.    Chief Complaint: Follow-up    Here for follow up of medical problems, and wt loss.  Has gained 6#.  Left sciatic pain x 2 weeks.  Comes and goes.  Taking vit D.  No f/c/sw/cough.  No cp/sob/palp.  BMs normal.  Not checking sugars.  Detrol is working well for urination.  Anxiety doing better on zoloft.    Updated/ annual due 2/18:  HM: 10/16 fluvax, 5/15 [id number], 10/16 booster [id number], 10/13 TDaP, 2/12 zoster, 4/13 BMD, 4/06 Cscope no repeat, 6/17 Eye Dr. Payan.              Review of Systems   Constitutional: Negative for chills, diaphoresis and fever.   Respiratory: Negative for cough and shortness of breath.    Cardiovascular: Negative for chest pain, palpitations and leg swelling.   Gastrointestinal: Negative for blood in stool, constipation, diarrhea, nausea and vomiting.   Genitourinary: Negative for dysuria, frequency and hematuria.   Psychiatric/Behavioral: The patient is not nervous/anxious.        Objective:   /68 (BP Location: Right arm, Patient Position: Sitting, BP Method: Manual)   Pulse 62   Temp 96.6 °F (35.9 °C) (Tympanic)   Ht 5' (1.524 m)   Wt 50.1 kg (110 lb 7.2 oz)   SpO2 96%   BMI 21.57 kg/m²     Physical Exam   Constitutional: She is oriented to person, place, and time. She appears well-developed.   HENT:   Mouth/Throat: Oropharynx is clear and moist.   Neck: Neck supple. Carotid bruit is not present. No thyroid mass present.   Cardiovascular: Normal rate, regular rhythm and intact distal pulses.  Exam reveals no gallop and no friction rub.    No murmur heard.  Pulmonary/Chest: Effort normal and breath sounds normal. She has no wheezes. She has no rales.   Abdominal: Soft. Bowel sounds are normal. She exhibits no mass. There is no hepatosplenomegaly. There is no tenderness.   Musculoskeletal: She exhibits no edema.   Lymphadenopathy:     She has no cervical adenopathy.   Neurological: She is alert and oriented  to person, place, and time.   Psychiatric: She has a normal mood and affect.       Assessment:       1. Mixed anxiety and depressive disorder    2. Weight loss    3. Controlled type 2 diabetes mellitus without complication, unspecified long term insulin use status    4. Acute left-sided low back pain with bilateral sciatica    5. Urinary frequency        Plan:       Martha was seen today for follow-up.    Diagnoses and all orders for this visit:    Mixed anxiety and depressive disorder- doing well on zoloft.    Weight loss- doing better now.    Controlled type 2 diabetes mellitus without complication, unspecified long term insulin use status    Acute left-sided low back pain with bilateral sciatica  -     triamcinolone acetonide injection 40 mg; Inject 1 mL (40 mg total) into the muscle one time.    RTC 3 mo.

## 2017-06-28 ENCOUNTER — TELEPHONE (OUTPATIENT)
Dept: ALLERGY | Facility: CLINIC | Age: 82
End: 2017-06-28

## 2017-06-28 DIAGNOSIS — J30.89 ALLERGIC RHINITIS DUE TO OTHER ALLERGEN: Primary | ICD-10-CM

## 2017-06-28 RX ORDER — MONTELUKAST SODIUM 10 MG/1
10 TABLET ORAL DAILY
Qty: 30 TABLET | Refills: 12 | Status: SHIPPED | OUTPATIENT
Start: 2017-06-28 | End: 2017-07-28

## 2017-06-28 NOTE — TELEPHONE ENCOUNTER
This note was dictated using voice recognition software and may contain errors.    She called on June 28 at 1:39 PM.  I returned her phone call at 5:20 PM on June 28, 2017.  I spoke with her.  She stated that she does believe that Singulair is helpful.  She is exhausted her supply of medication.  She requested a new prescription.  At her request a prescription was issued and was transmitted electronically to her pharmacy.  She may continue to take 1 pill daily.  Should she have other questions or concerns she was instructed to call.

## 2017-08-16 ENCOUNTER — HOSPITAL ENCOUNTER (OUTPATIENT)
Dept: RADIOLOGY | Facility: HOSPITAL | Age: 82
Discharge: HOME | End: 2017-08-16
Attending: NURSE PRACTITIONER
Payer: MEDICARE

## 2017-08-16 ENCOUNTER — OFFICE VISIT (OUTPATIENT)
Dept: INTERNAL MEDICINE | Facility: CLINIC | Age: 82
End: 2017-08-16
Payer: MEDICARE

## 2017-08-16 ENCOUNTER — HOSPITAL ENCOUNTER (EMERGENCY)
Facility: HOSPITAL | Age: 82
Discharge: HOME | End: 2017-08-16
Attending: EMERGENCY MEDICINE
Payer: MEDICARE

## 2017-08-16 VITALS
DIASTOLIC BLOOD PRESSURE: 74 MMHG | WEIGHT: 109 LBS | TEMPERATURE: 99 F | HEART RATE: 65 BPM | BODY MASS INDEX: 20.6 KG/M2 | OXYGEN SATURATION: 98 % | RESPIRATION RATE: 17 BRPM | SYSTOLIC BLOOD PRESSURE: 104 MMHG

## 2017-08-16 VITALS
HEIGHT: 61 IN | WEIGHT: 138.88 LBS | BODY MASS INDEX: 26.22 KG/M2 | DIASTOLIC BLOOD PRESSURE: 62 MMHG | SYSTOLIC BLOOD PRESSURE: 90 MMHG | TEMPERATURE: 121 F | HEART RATE: 96 BPM
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
## 2017-08-16 DIAGNOSIS — M25.562 ACUTE PAIN OF LEFT KNEE: ICD-10-CM

## 2017-08-16 DIAGNOSIS — M25.572 ACUTE LEFT ANKLE PAIN: ICD-10-CM

## 2017-08-16 DIAGNOSIS — M79.672 ACUTE FOOT PAIN, LEFT: ICD-10-CM
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
## 2017-08-16 DIAGNOSIS — S82.452A DISPLACED COMMINUTED FRACTURE OF SHAFT OF LEFT FIBULA, INITIAL ENCOUNTER FOR CLOSED FRACTURE: Primary | ICD-10-CM

## 2017-08-16 DIAGNOSIS — S82.455A CLOSED NONDISPLACED COMMINUTED FRACTURE OF SHAFT OF LEFT FIBULA, INITIAL ENCOUNTER: Primary | ICD-10-CM

## 2017-08-16 DIAGNOSIS — M81.0 AGE-RELATED OSTEOPOROSIS WITHOUT CURRENT PATHOLOGICAL FRACTURE: ICD-10-CM

## 2017-08-16 DIAGNOSIS — M79.662 PAIN OF LEFT LOWER LEG: ICD-10-CM

## 2017-08-16 PROCEDURE — 99999 PR PBB SHADOW E&M-EST. PATIENT-LVL V: CPT | Mod: PBBFAC,,, | Performed by: NURSE PRACTITIONER

## 2017-08-16 PROCEDURE — 73562 X-RAY EXAM OF KNEE 3: CPT | Mod: 26,LT,, | Performed by: RADIOLOGY

## 2017-08-16 PROCEDURE — 1159F MED LIST DOCD IN RCRD: CPT | Mod: S$GLB,,, | Performed by: NURSE PRACTITIONER

## 2017-08-16 PROCEDURE — 73610 X-RAY EXAM OF ANKLE: CPT | Mod: 26,LT,, | Performed by: RADIOLOGY

## 2017-08-16 PROCEDURE — 73590 X-RAY EXAM OF LOWER LEG: CPT | Mod: 26,LT,, | Performed by: RADIOLOGY

## 2017-08-16 PROCEDURE — 73560 X-RAY EXAM OF KNEE 1 OR 2: CPT | Mod: 26,59,RT, | Performed by: RADIOLOGY

## 2017-08-16 PROCEDURE — 99283 EMERGENCY DEPT VISIT LOW MDM: CPT | Mod: 25

## 2017-08-16 PROCEDURE — 29505 APPLICATION LONG LEG SPLINT: CPT | Mod: LT

## 2017-08-16 PROCEDURE — 99214 OFFICE O/P EST MOD 30 MIN: CPT | Mod: S$GLB,,, | Performed by: NURSE PRACTITIONER

## 2017-08-16 PROCEDURE — 73630 X-RAY EXAM OF FOOT: CPT | Mod: 26,LT,, | Performed by: RADIOLOGY

## 2017-08-16 PROCEDURE — 3008F BODY MASS INDEX DOCD: CPT | Mod: S$GLB,,, | Performed by: NURSE PRACTITIONER

## 2017-08-16 PROCEDURE — 99499 UNLISTED E&M SERVICE: CPT | Mod: S$GLB,,, | Performed by: NURSE PRACTITIONER

## 2017-08-16 PROCEDURE — 1125F AMNT PAIN NOTED PAIN PRSNT: CPT | Mod: S$GLB,,, | Performed by: NURSE PRACTITIONER

## 2017-08-16 NOTE — PROGRESS NOTES
Pt has comminuted fibula fracture. Lives alone upstairs. Needs to have fracture stabilized and assessed prior to her going home. Orthopedics is unavailable this evening. Will send her to the ER to have fracture stabilized/evaluated. Patient's neighbor will bring her to [facility] ER.

## 2017-08-16 NOTE — PROGRESS NOTES
Subjective:       Patient ID: Martha Terrell is a 89 y.o. female.    Chief Complaint: Fall and Leg Pain (left )    Pt states that this morning (8:30 am) she was walking her dog when she tripped over a defect in the side walk falling on her left knee/left lower leg/left foot/ankle. She reports that she was alone when the incident happened. She was able to walk back, but she lives upstairs, so her neighbor assisted her upstairs. She reports that she took 2 aspirin which helped somewhat. She does have a hx of osteroporosis. She does not fall frequently. She lives alone. Her neighbor is present with her today.       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 OTTO  4/25/13: SLT OS  No date: TONSILLECTOMY      Comment: at age 6    Current Outpatient Prescriptions:  ascorbic acid (VITAMIN C) 500 MG tablet, Take 500 mg by mouth once daily.  brimonidine-timolol (COMBIGAN) 0.2-0.5 % Drop, instill 1 drop into each eye twice daily  calcium-vitamin D tablet 600 mg-200 units, Take 1 tablet by mouth once daily.  hydrOXYzine HCl (ATARAX) 10 MG Tab, Take 1-2 tablets (10-20 mg total) by mouth 3 (three) times daily as needed (anxiety--- PLEASE INCLUDE ON RX, disregard other rx.).  latanoprost 0.005 % ophthalmic solution, INSTILL 1 DROP INTO EACH EYE IN THE EVENING (Patient taking differently: Place into the left eye every evening. INSTILL 1 DROP INTO EACH EYE IN THE EVENING)  lutein 6 mg Cap, Take by mouth.  meclizine (ANTIVERT) 12.5 mg tablet, Take 1 tablet (12.5 mg total) by  mouth 3 (three) times daily as needed.  VITAMIN B COMP W-C/ZINC (B COMPLEX-C-E-ZN) CpSR, Take 1 capsule by mouth.  vitamin E 400 UNIT capsule, Take 400 Units by mouth once daily.    No current facility-administered medications for this visit.         Review of Systems   Musculoskeletal: Positive for arthralgias (left knee, left leg, left ankle, left foot ) and gait problem.   All other systems reviewed and are negative.      Objective:      Physical Exam   Musculoskeletal:        Left knee: She exhibits decreased range of motion.        Left ankle: She exhibits decreased range of motion.        Left lower leg: She exhibits tenderness.        Left foot: There is decreased range of motion, tenderness and bony tenderness.        Feet:        Assessment:       1. Fall, initial encounter    2. Age-related osteoporosis without current pathological fracture    3. Acute left ankle pain    4. Acute pain of left knee    5. Pain of left lower leg    6. Acute foot pain, left        Plan:   Fall, initial encounter    Age-related osteoporosis without current pathological fracture    Acute left ankle pain  -     X-Ray Ankle Complete Left; Future; Expected date: 08/16/2017    Acute pain of left knee  -     X-ray Knee Ortho Left; Future; Expected date: 08/16/2017    Pain of left lower leg  -     X-Ray Tibia Fibula 2 View Left; Future; Expected date: 08/16/2017    Acute foot pain, left  -     X-Ray Foot Complete Left; Future; Expected date: 08/16/2017      As above  Ice and elevated affected areas  Tylenol every 8 hours PRN, ibuprofen 2 pills every 12 hours PRN  Will call with xray results

## 2017-08-17 NOTE — ED PROVIDER NOTES
SCRIBE #1 NOTE: I, Sary Cordoba, am scribing for, and in the presence of, Abdelrahman Vo NP. I have scribed the entire note.      History      Chief Complaint   Patient presents with    Fall     fell this afternoon and seen at [facility] on [address] and sent here for L tibia fx       Review of patient's allergies indicates:   Allergen Reactions    Amlodipine      Other reaction(s): heart pounding    Benazepril      Other reaction(s): COUGH    Dyazide  [triamterene-hydrochlorothiazid]      Other reaction(s): Rash        HPI   HPI    8/16/2017, 8:24 PM   History obtained from the patient      History of Present Illness: Martha Terrell is a 89 y.o. female patient who presents to the Emergency Department for L leg pain which onset suddenly this afternoon. Symptoms are constant and moderate in severity. Pt reports falling earlier today hurting her L leg. Pt states she went to [facility] on [address] and was told she had a L tibia fracture and was referred to ED. No mitigating or exacerbating factors reported. No associated sxs at this time. Patient denies any back pain, neck pain, LOC, HA, dizziness, weakness/numbness, and all other sxs at this time. No prior Tx. No further complaints or concerns at this time.         Arrival mode:Personal vehicle    PCP: Nichelle Cruz MD       Past Medical History:  Past Medical History:   Diagnosis Date    Anxiety     Cataract     ERMS (embryonal rhabdomyosarcoma)     OS    ERMS (embryonal rhabdomyosarcoma) 1/27/2016    OS     Hiatal hernia 4/16/13    EGD    Insomnia     falling asleep    OP (osteoporosis)        Past Surgical History:  Past Surgical History:   Procedure Laterality Date    CATARACT EXTRACTION W/  INTRAOCULAR LENS IMPLANT Right 03/10/2017    iStent     HYSTERECTOMY      at age 41    PCIOL Right 03/10/2017    DR. OTTO    SLT OS  4/25/13    TONSILLECTOMY      at age 6         Family History:  Family History   Problem Relation Age of Onset    Cancer Father      Amblyopia Neg Hx     Blindness Neg Hx     Cataracts Neg Hx     Diabetes Neg Hx     Glaucoma Neg Hx     Hypertension Neg Hx     Macular degeneration Neg Hx     Retinal detachment Neg Hx     Strabismus Neg Hx     Stroke Neg Hx     Thyroid disease Neg Hx        Social History:  Social History     Social History Main Topics    Smoking status: Former Smoker     Packs/day: 0.25     Years: 30.00     Quit date: 1/27/1966    Smokeless tobacco: Never Used    Alcohol use No    Drug use: No    Sexual activity: No       ROS   Review of Systems   Constitutional: Negative for fever.   HENT: Negative for sore throat.    Respiratory: Negative for shortness of breath.    Cardiovascular: Negative for chest pain.   Gastrointestinal: Negative for nausea.   Genitourinary: Negative for dysuria.   Musculoskeletal: Negative for back pain and neck pain.        (+) L leg pain   Skin: Negative for rash.   Neurological: Negative for dizziness, syncope, weakness, numbness and headaches.   Hematological: Does not bruise/bleed easily.   All other systems reviewed and are negative.    Physical Exam      Initial Vitals [08/16/17 1856]   BP Pulse Resp Temp SpO2   (!) 107/58 61 18 98.7 °F (37.1 °C) (!) 94 %      MAP       74.33          Physical Exam  Nursing Notes and Vital Signs Reviewed.  Constitutional: Patient is in no acute distress. Well-developed and well-nourished.  Head: Atraumatic. Normocephalic.  Eyes: PERRL. EOM intact. Conjunctivae are not pale. No scleral icterus.  ENT: Mucous membranes are moist. Oropharynx is clear and symmetric.    Neck: Supple. Full ROM. No lymphadenopathy.  Cardiovascular: Regular rate. Regular rhythm. No murmurs, rubs, or gallops. Distal pulses are 2+ and symmetric.  Pulmonary/Chest: No respiratory distress. Clear to auscultation bilaterally. No wheezing, rales, or rhonchi.  Abdominal: Soft and non-distended.  There is no tenderness.  No rebound, guarding, or rigidity. Good bowel  sounds.  Genitourinary: No CVA tenderness  Musculoskeletal: Moves all extremities. No obvious deformities. No edema. No calf tenderness.  LLE: no evident deformity. Tenderness to L lateral leg. ROM is limited secondary to injury. Cap refill distally is <2 seconds. DP and PT pulses are equal and 2+ bilaterally. No motor deficit. No distal sensory deficit  Skin: Warm and dry.  Neurological:  Alert, awake, and appropriate.  Normal speech.  No acute focal neurological deficits are appreciated.  Psychiatric: Normal affect. Good eye contact. Appropriate in content.    ED Course    Orthopedic Injury  Date/Time: 8/16/2017 8:31 PM  Authorized by: FAVIO WEATHERS   Performed by: FAVIO WEATHERS  Injury location: lower leg  Location details: left lower leg  Injury type: fracture  Fracture type: tibial shaft  Pre-procedure distal perfusion: normal  Pre-procedure neurological function: normal  Pre-procedure neurovascular assessment: neurovascularly intact  Pre-procedure range of motion: reduced  Local anesthesia used: no    Anesthesia:  Local anesthesia used: no    Sedation:  Patient sedated: no  Manipulation performed: no  Immobilization: splint  Splint type: long leg  Complications: No  Post-procedure neurovascular assessment: post-procedure neurovascularly intact  Post-procedure distal perfusion: normal  Post-procedure neurological function: normal  Post-procedure range of motion: unchanged  Patient tolerance: Patient tolerated the procedure well with no immediate complications        ED Vital Signs:  Vitals:    08/16/17 1856 08/16/17 2120   BP: (!) 107/58 104/74   Pulse: 61 65   Resp: 18 17   Temp: 98.7 °F (37.1 °C)    TempSrc: Oral    SpO2: (!) 94% 98%   Weight: 49.4 kg (109 lb)             The Emergency Provider reviewed the vital signs and test results, which are outlined above.    ED Discussion     8:33 PM: Discussed with pt all pertinent ED information and results. Discussed pt dx and plan of tx. Gave pt all f/u and  return to the ED instructions. All questions and concerns were addressed at this time. Pt expresses understanding of information and instructions, and is comfortable with plan to discharge. Pt is stable for discharge.        ED Medication(s):  Medications - No data to display    Discharge Medication List as of 8/16/2017  8:29 PM          Follow-up Information     Schedule an appointment as soon as possible for a visit  with [facility].    Specialty:  Orthopedics  Contact information:  [address] [address] [phone]  Additional information:  (off [address]) 2nd floor           [facility] - .    Specialty:  Emergency Medicine  Why:  As needed, If symptoms worsen  Contact information:  [address]  [facility] [address] [phone]                   Medical Decision Making              Scribe Attestation:   Scribe #1: I performed the above scribed service and the documentation accurately describes the services I performed. I attest to the accuracy of the note.    Attending:   Physician Attestation Statement for Scribe #1: I, Abdelrahman Vo NP, personally performed the services described in this documentation, as scribed by Sary Cordoba, in my presence, and it is both accurate and complete.          Clinical Impression       ICD-10-CM ICD-9-CM   1. Closed nondisplaced comminuted fracture of shaft of left fibula, initial encounter S82.455A 823.21   2. Fall, initial encounter W19.XXXA E888.9   3. Pain of left lower leg M79.662 729.5       Disposition:   Disposition: Discharged  Condition: Stable         Abdelrahman Vo NP  08/17/17 0215       Abdelrahman Vo NP  08/17/17 0215

## 2017-08-17 NOTE — ED NOTES
Pt had fallen earlier this afternoon. Was sent here from the ortho clinic for left tibula fx. Denies any LOC or trauma to head.

## 2017-08-18 ENCOUNTER — TELEPHONE (OUTPATIENT)
Dept: INTERNAL MEDICINE | Facility: CLINIC | Age: 82
End: 2017-08-18

## 2017-08-18 NOTE — TELEPHONE ENCOUNTER
----- Message from Philly Fernández sent at 8/18/2017  9:30 AM CDT -----  Contact: Shaista Mason   Pt is having trouble getting in and out of the tub and handling other things for herself, they need to get an order for home health,,,,pt has a broken fibular, pt can;t hear phone please call  Shaista Mason [phone]

## 2017-08-21 ENCOUNTER — TELEPHONE (OUTPATIENT)
Dept: INTERNAL MEDICINE | Facility: CLINIC | Age: 82
End: 2017-08-21

## 2017-08-21 NOTE — TELEPHONE ENCOUNTER
----- Message from Soy Beaver sent at 8/21/2017 10:13 AM CDT -----  Contact: Renetta, pt's friend  She's calling in regards to the pt starting Home Health care, she states this is her 2nd message concerning this and has not heard from anyone, please advise, ph# [phone] (cell)      Since I haven't seen pt for this, please give verbal HH eval and treat.  SM

## 2017-08-22 ENCOUNTER — OFFICE VISIT (OUTPATIENT)
Dept: INTERNAL MEDICINE | Facility: CLINIC | Age: 82
End: 2017-08-22
Payer: MEDICARE

## 2017-08-22 ENCOUNTER — TELEPHONE (OUTPATIENT)
Dept: INTERNAL MEDICINE | Facility: CLINIC | Age: 82
End: 2017-08-22

## 2017-08-22 VITALS
HEIGHT: 61 IN | SYSTOLIC BLOOD PRESSURE: 152 MMHG | HEART RATE: 72 BPM | TEMPERATURE: 97 F | OXYGEN SATURATION: 94 % | DIASTOLIC BLOOD PRESSURE: 86 MMHG

## 2017-08-22 DIAGNOSIS — E11.9 CONTROLLED TYPE 2 DIABETES MELLITUS WITHOUT COMPLICATION, WITHOUT LONG-TERM CURRENT USE OF INSULIN: Chronic | ICD-10-CM

## 2017-08-22 DIAGNOSIS — S82.202A CLOSED FRACTURE OF SHAFT OF LEFT TIBIA, UNSPECIFIED FRACTURE MORPHOLOGY, INITIAL ENCOUNTER: ICD-10-CM

## 2017-08-22 DIAGNOSIS — F41.8 MIXED ANXIETY AND DEPRESSIVE DISORDER: ICD-10-CM

## 2017-08-22 DIAGNOSIS — S82.92XA LEG FRACTURE, LEFT, CLOSED, INITIAL ENCOUNTER: Primary | ICD-10-CM

## 2017-08-22 DIAGNOSIS — R63.4 WEIGHT LOSS: Primary | ICD-10-CM

## 2017-08-22 PROCEDURE — 99214 OFFICE O/P EST MOD 30 MIN: CPT | Mod: S$GLB,,, | Performed by: INTERNAL MEDICINE

## 2017-08-22 PROCEDURE — 1126F AMNT PAIN NOTED NONE PRSNT: CPT | Mod: S$GLB,,, | Performed by: INTERNAL MEDICINE

## 2017-08-22 PROCEDURE — 99999 PR PBB SHADOW E&M-EST. PATIENT-LVL III: CPT | Mod: PBBFAC,,, | Performed by: INTERNAL MEDICINE

## 2017-08-22 PROCEDURE — 1159F MED LIST DOCD IN RCRD: CPT | Mod: S$GLB,,, | Performed by: INTERNAL MEDICINE

## 2017-08-22 PROCEDURE — 3008F BODY MASS INDEX DOCD: CPT | Mod: S$GLB,,, | Performed by: INTERNAL MEDICINE

## 2017-08-22 PROCEDURE — 99499 UNLISTED E&M SERVICE: CPT | Mod: S$GLB,,, | Performed by: INTERNAL MEDICINE

## 2017-08-22 NOTE — TELEPHONE ENCOUNTER
----- Message from Catrachita Araya sent at 8/22/2017  8:19 AM CDT -----  Contact: joyce [phone]  Would like to consult with nurse regarding available appointment this afternoon per vm left for patient. Please call back at [phone].      Thanks,  Catrachita Araya

## 2017-08-22 NOTE — PROGRESS NOTES
"Subjective:       Patient ID: Martha Terrell is a 89 y.o. female.    Chief Complaint: Follow-up    Here for follow up of medical problems.  Has gained back about 5 pounds.  Last weak walking dog and was pulled down.  Shattered left tibia.  [facility] to [facility], put in boot and told to f/u in 6 weeks.  Having trouble getting boot on and off, having trouble with bathing self and caring for self with the boot.      Updated/ annual due 2/18:  HM: 10/16 fluvax, 5/15 [id number], 10/16 booster [id number], 10/13 TDaP, 2/12 zoster, 4/13 BMD, 4/06 Cscope no repeat, 6/17 Eye Dr. Payan.          Review of Systems   Constitutional: Negative for chills, diaphoresis and fever.   Respiratory: Negative for cough and shortness of breath.    Cardiovascular: Negative for chest pain, palpitations and leg swelling.   Gastrointestinal: Negative for blood in stool, constipation, diarrhea, nausea and vomiting.   Genitourinary: Negative for dysuria, frequency and hematuria.   Psychiatric/Behavioral: The patient is not nervous/anxious.        Objective:   BP (!) 152/86 (BP Location: Right arm)   Pulse 72   Temp 97.2 °F (36.2 °C) (Tympanic)   Ht 5' 1" (1.549 m)   SpO2 (!) 94%     Physical Exam   Constitutional: She is oriented to person, place, and time. She appears well-developed.   HENT:   Mouth/Throat: Oropharynx is clear and moist.   Neck: Neck supple. Carotid bruit is not present. No thyroid mass present.   Cardiovascular: Normal rate, regular rhythm and intact distal pulses.  Exam reveals no gallop and no friction rub.    No murmur heard.  Pulmonary/Chest: Effort normal and breath sounds normal. She has no wheezes. She has no rales.   Abdominal: Soft. Bowel sounds are normal. She exhibits no mass. There is no hepatosplenomegaly. There is no tenderness.   Musculoskeletal: She exhibits no edema.   Lymphadenopathy:     She has no cervical adenopathy.   Neurological: She is alert and oriented to person, place, and time. "   Psychiatric: She has a normal mood and affect.       Assessment:       1. Weight loss    2. Controlled type 2 diabetes mellitus without complication, without long-term current use of insulin    3. Mixed anxiety and depressive disorder    4. Closed fracture of shaft of left tibia, unspecified fracture morphology, initial encounter        Plan:       Martha was seen today for follow-up.    Diagnoses and all orders for this visit:    Weight loss- now better.    Controlled type 2 diabetes mellitus without complication, without long-term current use of insulin- stable.    Mixed anxiety and depressive disorder- doing ok.    Closed fracture of shaft of left tibia, unspecified fracture morphology, initial encounter-  alvarez to help pt deal with ADL with fx leg.    RTC 3mo.

## 2017-10-06 ENCOUNTER — TELEPHONE (OUTPATIENT)
Dept: INTERNAL MEDICINE | Facility: CLINIC | Age: 82
End: 2017-10-06

## 2017-10-06 NOTE — TELEPHONE ENCOUNTER
----- Message from Sharon Duggan sent at 10/5/2017  4:14 PM CDT -----  Would like to speak to nurse. Please call back at [phone]. thanks

## 2017-10-09 ENCOUNTER — TELEPHONE (OUTPATIENT)
Dept: INTERNAL MEDICINE | Facility: CLINIC | Age: 82
End: 2017-10-09

## 2017-10-09 NOTE — TELEPHONE ENCOUNTER
S/w PT from [facility].  She is presently with pt.  States pt reports falling on 9/29/17.  Pt did not seek any medical help. She is c/o rib pain that is spreading toward her back that is worsening.  Pt only wants to see Dr. Lugo.  Scheduled appt for tomorrow.  Instructed PT to advise pt to go to ER if pain worsens.  I heard pt verbalize understanding./rpr

## 2017-10-10 ENCOUNTER — HOSPITAL ENCOUNTER (OUTPATIENT)
Dept: RADIOLOGY | Facility: HOSPITAL | Age: 82
Discharge: HOME | End: 2017-10-10
Attending: INTERNAL MEDICINE
Payer: MEDICARE

## 2017-10-10 ENCOUNTER — OFFICE VISIT (OUTPATIENT)
Dept: INTERNAL MEDICINE | Facility: CLINIC | Age: 82
End: 2017-10-10
Payer: MEDICARE

## 2017-10-10 VITALS
DIASTOLIC BLOOD PRESSURE: 60 MMHG | HEART RATE: 68 BPM | TEMPERATURE: 96 F | HEIGHT: 61 IN | SYSTOLIC BLOOD PRESSURE: 118 MMHG | OXYGEN SATURATION: 94 %
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
## 2017-10-10 DIAGNOSIS — R07.9 RIGHT-SIDED CHEST PAIN: ICD-10-CM
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
## 2017-10-10 PROCEDURE — 99999 PR PBB SHADOW E&M-EST. PATIENT-LVL III: CPT | Mod: PBBFAC,,, | Performed by: INTERNAL MEDICINE

## 2017-10-10 PROCEDURE — 71100 X-RAY EXAM RIBS UNI 2 VIEWS: CPT | Mod: 26,,, | Performed by: RADIOLOGY

## 2017-10-10 PROCEDURE — 99213 OFFICE O/P EST LOW 20 MIN: CPT | Mod: S$GLB,,, | Performed by: INTERNAL MEDICINE

## 2017-10-10 PROCEDURE — 71020 XR CHEST PA AND LATERAL: CPT | Mod: 26,,, | Performed by: RADIOLOGY

## 2017-10-10 PROCEDURE — 71020 XR CHEST PA AND LATERAL: CPT | Mod: TC,PO

## 2017-10-10 PROCEDURE — 90662 IIV NO PRSV INCREASED AG IM: CPT | Mod: S$GLB,,, | Performed by: INTERNAL MEDICINE

## 2017-10-10 PROCEDURE — G0008 ADMIN INFLUENZA VIRUS VAC: HCPCS | Mod: S$GLB,,, | Performed by: INTERNAL MEDICINE

## 2017-10-10 PROCEDURE — 71100 X-RAY EXAM RIBS UNI 2 VIEWS: CPT | Mod: TC,PO

## 2017-10-10 RX ORDER — NAPROXEN 500 MG/1
500 TABLET ORAL 2 TIMES DAILY WITH MEALS
Qty: 40 TABLET | Refills: 2 | Status: SHIPPED | OUTPATIENT
Start: 2017-10-10 | End: 2018-03-05

## 2017-10-10 NOTE — PROGRESS NOTES
"Subjective:       Patient ID: Martha Terrell is a 89 y.o. female.    Chief Complaint: Fall    Here for follow up of medical problems.  Still in boot for left tibial fx.  Fell in the bath room in the middle of the night 4d ago.  Slipped on the floor mat.  Fell between tub and toilet.  Having right side rib pain, dannie with movement and deep breath.  No f/c/sw.  Severe pain with cough or sneeze.    Updated/ annual due 2/18:  HM: 10/16 fluvax, 5/15 [id number], 10/16 booster [id number], 10/13 TDaP, 2/12 zoster, 4/13 BMD, 4/06 Cscope no repeat, 6/17 Eye Dr. Payan.          Review of Systems   Constitutional: Negative for chills, diaphoresis and fever.   Respiratory: Negative for cough and shortness of breath.    Cardiovascular: Negative for chest pain, palpitations and leg swelling.   Gastrointestinal: Negative for blood in stool, constipation, diarrhea, nausea and vomiting.   Genitourinary: Negative for dysuria, frequency and hematuria.   Psychiatric/Behavioral: The patient is not nervous/anxious.        Objective:   /60 (BP Location: Right arm, Patient Position: Sitting)   Pulse 68   Temp 96 °F (35.6 °C) (Tympanic)   Ht 5' 1" (1.549 m)   SpO2 (!) 94%     Physical Exam   Constitutional: She is oriented to person, place, and time. She appears well-developed.   HENT:   Mouth/Throat: Oropharynx is clear and moist.   Neck: Neck supple. Carotid bruit is not present. No thyroid mass present.   Cardiovascular: Normal rate, regular rhythm and intact distal pulses.  Exam reveals no gallop and no friction rub.    No murmur heard.  Pulmonary/Chest: Effort normal. She has no wheezes. She has rales (right base.  tender right lower ribs.).   Abdominal: Soft. Bowel sounds are normal. She exhibits no mass. There is no hepatosplenomegaly. There is no tenderness.   Musculoskeletal: She exhibits no edema.   Lymphadenopathy:     She has no cervical adenopathy.   Neurological: She is alert and oriented to person, place, and time. "   Psychiatric: She has a normal mood and affect.       Assessment:       1. Fall, initial encounter    2. Right-sided chest pain        Plan:       Martha was seen today for fall.    Diagnoses and all orders for this visit:    Fall, initial encounter/ Right-sided chest pain s/p fall 4d ago.  Xrays and return now.---CXR clear, no acute rib fractures.  Naprosyn 375mg bid for 1-2 weeks.  -     X-Ray Ribs 2 View Right; Future  -     X-Ray Chest PA And Lateral; Future

## 2017-10-18 ENCOUNTER — OFFICE VISIT (OUTPATIENT)
Dept: OPHTHALMOLOGY | Facility: CLINIC | Age: 82
End: 2017-10-18
Payer: MEDICARE

## 2017-10-18 DIAGNOSIS — H40.1132 PRIMARY OPEN ANGLE GLAUCOMA OF BOTH EYES, MODERATE STAGE: Primary | ICD-10-CM

## 2017-10-18 DIAGNOSIS — H25.012 CORTICAL SENILE CATARACT OF LEFT EYE: ICD-10-CM

## 2017-10-18 DIAGNOSIS — Z98.41 CATARACT EXTRACTION STATUS, RIGHT: ICD-10-CM

## 2017-10-18 PROCEDURE — 92012 INTRM OPH EXAM EST PATIENT: CPT | Mod: S$GLB,,, | Performed by: OPHTHALMOLOGY

## 2017-10-18 PROCEDURE — 92133 CPTRZD OPH DX IMG PST SGM ON: CPT | Mod: S$GLB,,, | Performed by: OPHTHALMOLOGY

## 2017-10-18 PROCEDURE — 99499 UNLISTED E&M SERVICE: CPT | Mod: S$GLB,,, | Performed by: OPHTHALMOLOGY

## 2017-10-18 PROCEDURE — 99999 PR PBB SHADOW E&M-EST. PATIENT-LVL I: CPT | Mod: PBBFAC,,, | Performed by: OPHTHALMOLOGY

## 2017-11-17 DIAGNOSIS — F41.8 MIXED ANXIETY AND DEPRESSIVE DISORDER: ICD-10-CM

## 2017-11-17 RX ORDER — SERTRALINE HYDROCHLORIDE 50 MG/1
TABLET, FILM COATED ORAL
Qty: 30 TABLET | Refills: 11 | Status: SHIPPED | OUTPATIENT
Start: 2017-11-17 | End: 2017-12-18 | Stop reason: SDUPTHER

## 2017-12-05 ENCOUNTER — OFFICE VISIT (OUTPATIENT)
Dept: OPHTHALMOLOGY | Facility: CLINIC | Age: 82
End: 2017-12-05
Payer: MEDICARE

## 2017-12-05 DIAGNOSIS — H35.373 EPIRETINAL MEMBRANE, BOTH EYES: ICD-10-CM

## 2017-12-05 DIAGNOSIS — H40.1132 PRIMARY OPEN ANGLE GLAUCOMA OF BOTH EYES, MODERATE STAGE: Primary | ICD-10-CM

## 2017-12-05 DIAGNOSIS — Z98.41 CATARACT EXTRACTION STATUS, RIGHT: ICD-10-CM

## 2017-12-05 DIAGNOSIS — H25.012 CORTICAL SENILE CATARACT OF LEFT EYE: ICD-10-CM

## 2017-12-05 PROCEDURE — 92250 FUNDUS PHOTOGRAPHY W/I&R: CPT | Mod: S$GLB,,, | Performed by: OPHTHALMOLOGY

## 2017-12-05 PROCEDURE — 92014 COMPRE OPH EXAM EST PT 1/>: CPT | Mod: S$GLB,,, | Performed by: OPHTHALMOLOGY

## 2017-12-05 PROCEDURE — 99999 PR PBB SHADOW E&M-EST. PATIENT-LVL II: CPT | Mod: PBBFAC,,, | Performed by: OPHTHALMOLOGY

## 2017-12-05 PROCEDURE — 99499 UNLISTED E&M SERVICE: CPT | Mod: S$GLB,,, | Performed by: OPHTHALMOLOGY

## 2017-12-05 RX ORDER — DIFLUPREDNATE OPHTHALMIC 0.5 MG/ML
1 EMULSION OPHTHALMIC 4 TIMES DAILY
Qty: 1 BOTTLE | Refills: 1 | Status: CANCELLED | OUTPATIENT
Start: 2017-12-05 | End: 2018-01-04

## 2017-12-05 RX ORDER — POLYMYXIN B SULFATE AND TRIMETHOPRIM 1; 10000 MG/ML; [USP'U]/ML
1 SOLUTION OPHTHALMIC EVERY 4 HOURS
Qty: 1 BOTTLE | Refills: 1 | Status: CANCELLED | OUTPATIENT
Start: 2017-12-05 | End: 2017-12-15

## 2017-12-05 NOTE — PROGRESS NOTES
HPI     Patient returns for a 2 month dilation, cat check, os and sdp's, patient   is ready to scheduled her phaco os, patient is not driving now due to poor   vision.        COAG  Phaco I-STENT OD +21.0 SN6AT5 / CDE 12.60 3-   DRY AMD  ERM OS        OU Combigan BID   OS LATANOPROST QHS        COAG  Phaco I-STENT OD +21.0 SN6AT5 / CDE 12.60 3-   DRY AMD  ERM OS        OU Combigan BID OU, LATANOPROST QHS    Last edited by XU Wang on 12/5/2017  1:53 PM. (History)            Assessment /Plan     For exam results, see Encounter Report.      ICD-10-CM ICD-9-CM    1. Primary open angle glaucoma of both eyes, moderate stage H40.1132 365.11 Color Fundus Photography - OU - Both Eyes    Doing well - intraocular pressure is within acceptable range relative to target pressure with no evidence of progression.   Continue current treatment.  Reviewed importance of continued compliance with treatment and follow up.        365.72    2. Cortical senile cataract of left eye H25.012 366.15 Surgery not recommended due to ERM OS   3. Cataract extraction status, right Z98.41 V45.61 stable   4. Epiretinal membrane, both eyes H35.373 362.56 Posterior Segment OCT Retina-Both eyes    Declines evaluation.      OU Combigan BID   OU LATANOPROST QHS  Return to clinic 6 months with IOP ck and GOCT.

## 2017-12-15 DIAGNOSIS — H40.1132 PRIMARY OPEN ANGLE GLAUCOMA OF BOTH EYES, MODERATE STAGE: ICD-10-CM

## 2017-12-15 RX ORDER — BRIMONIDINE TARTRATE, TIMOLOL MALEATE 2; 5 MG/ML; MG/ML
SOLUTION/ DROPS OPHTHALMIC
Qty: 10 ML | Refills: 11 | Status: SHIPPED | OUTPATIENT
Start: 2017-12-15 | End: 2017-12-15 | Stop reason: SDUPTHER

## 2017-12-15 RX ORDER — BRIMONIDINE TARTRATE AND TIMOLOL MALEATE 2; 5 MG/ML; MG/ML
1 SOLUTION OPHTHALMIC 2 TIMES DAILY
Qty: 10 ML | Refills: 4 | Status: SHIPPED | OUTPATIENT
Start: 2017-12-15 | End: 2018-03-05 | Stop reason: SDUPTHER

## 2017-12-15 RX ORDER — LATANOPROST 50 UG/ML
SOLUTION/ DROPS OPHTHALMIC
Qty: 3 BOTTLE | Refills: 4 | Status: SHIPPED | OUTPATIENT
Start: 2017-12-15 | End: 2017-12-21 | Stop reason: SDUPTHER

## 2017-12-15 NOTE — TELEPHONE ENCOUNTER
SPOKE WITH PATIENT AND SHE REQUESTED REFILLS ON HER LATANOPROST AND COMBIGAN WITH A 3 MONTH SUPPLY ON BOTH.PHONED [facility] ON [address] AND [address] REFILLS PER DR. OTTO.

## 2017-12-15 NOTE — TELEPHONE ENCOUNTER
----- Message from Aidee Pabon sent at 12/15/2017 12:57 PM CST -----  Contact: Patient   Patient request a call back at [phone], Regards to her medication.    Thanks  td

## 2017-12-18 DIAGNOSIS — H40.1132 PRIMARY OPEN ANGLE GLAUCOMA OF BOTH EYES, MODERATE STAGE: ICD-10-CM

## 2017-12-18 DIAGNOSIS — F41.8 MIXED ANXIETY AND DEPRESSIVE DISORDER: ICD-10-CM

## 2017-12-18 RX ORDER — BRIMONIDINE TARTRATE, TIMOLOL MALEATE 2; 5 MG/ML; MG/ML
SOLUTION/ DROPS OPHTHALMIC
Qty: 5 ML | Refills: 11 | Status: SHIPPED | OUTPATIENT
Start: 2017-12-18 | End: 2018-02-05 | Stop reason: SDUPTHER

## 2017-12-18 RX ORDER — SERTRALINE HYDROCHLORIDE 50 MG/1
TABLET, FILM COATED ORAL
Qty: 90 TABLET | Refills: 3 | Status: SHIPPED | OUTPATIENT
Start: 2017-12-18 | End: 2018-06-26 | Stop reason: SDUPTHER

## 2017-12-18 NOTE — TELEPHONE ENCOUNTER
----- Message from Kimberly Mercado sent at 12/18/2017  2:03 PM CST -----  Contact: pt  1. What is the name of the medication you are requesting? Sertraline  2. What is the dose? 50mg  3. How do you take the medication? Orally, topically, etc? orally  4. How often do you take this medication? Once a day   5. Do you need a 30 day or 90 day supply? 90  6. How many refills are you requesting? unknown  7. What is your preferred pharmacy and location of the pharmacy? [facility]  8. Who can we contact with further questions? [phone]    Pt would like to speak with nurse

## 2017-12-19 DIAGNOSIS — H40.1132 PRIMARY OPEN ANGLE GLAUCOMA OF BOTH EYES, MODERATE STAGE: ICD-10-CM

## 2017-12-19 RX ORDER — BRIMONIDINE TARTRATE, TIMOLOL MALEATE 2; 5 MG/ML; MG/ML
SOLUTION/ DROPS OPHTHALMIC
Qty: 10 ML | Refills: 6 | Status: SHIPPED | OUTPATIENT
Start: 2017-12-19 | End: 2018-03-05 | Stop reason: SDUPTHER

## 2017-12-21 ENCOUNTER — TELEPHONE (OUTPATIENT)
Dept: OPHTHALMOLOGY | Facility: CLINIC | Age: 82
End: 2017-12-21

## 2017-12-21 DIAGNOSIS — H40.1132 PRIMARY OPEN ANGLE GLAUCOMA OF BOTH EYES, MODERATE STAGE: ICD-10-CM

## 2017-12-21 RX ORDER — LATANOPROST 50 UG/ML
1 SOLUTION/ DROPS OPHTHALMIC NIGHTLY
Qty: 3 BOTTLE | Refills: 4 | Status: SHIPPED | OUTPATIENT
Start: 2017-12-21 | End: 2018-02-05 | Stop reason: SDUPTHER

## 2017-12-21 NOTE — TELEPHONE ENCOUNTER
----- Message from Maureen Long sent at 12/21/2017 11:35 AM CST -----  Contact: [facility]   Calling need directions before filling latanoprost 0.005 % ophthalmic solution pls call [phone] thx

## 2017-12-21 NOTE — TELEPHONE ENCOUNTER
----- Message from Aidee Knutson sent at 12/21/2017 10:37 AM CST -----  Contact: [facility]  He is calling in to verify a Rxp. He can be reached at the number below      [place] [id number] - [facility] [address]  [address] [address]  Phone: [phone] Fax: [phone]

## 2018-01-09 ENCOUNTER — PATIENT OUTREACH (OUTPATIENT)
Dept: ADMINISTRATIVE | Facility: HOSPITAL | Age: 83
End: 2018-01-09

## 2018-01-09 ENCOUNTER — TELEPHONE (OUTPATIENT)
Dept: INTERNAL MEDICINE | Facility: CLINIC | Age: 83
End: 2018-01-09

## 2018-01-09 NOTE — TELEPHONE ENCOUNTER
----- Message from Malou Davis sent at 1/9/2018  3:26 PM CST -----  Contact: [phone]   Returning call, please call back at [phone]. Thx-AH

## 2018-02-04 DIAGNOSIS — H40.1132 PRIMARY OPEN ANGLE GLAUCOMA OF BOTH EYES, MODERATE STAGE: ICD-10-CM

## 2018-02-05 RX ORDER — BRIMONIDINE TARTRATE AND TIMOLOL MALEATE 2; 5 MG/ML; MG/ML
1 SOLUTION OPHTHALMIC 2 TIMES DAILY
Qty: 5 ML | Refills: 4 | Status: SHIPPED | OUTPATIENT
Start: 2018-02-05 | End: 2018-06-26 | Stop reason: SDUPTHER

## 2018-02-05 RX ORDER — BRIMONIDINE TARTRATE, TIMOLOL MALEATE 2; 5 MG/ML; MG/ML
SOLUTION/ DROPS OPHTHALMIC
Qty: 5 ML | Refills: 10 | Status: SHIPPED | OUTPATIENT
Start: 2018-02-05 | End: 2018-03-05 | Stop reason: SDUPTHER

## 2018-02-05 RX ORDER — LATANOPROST 50 UG/ML
1 SOLUTION/ DROPS OPHTHALMIC NIGHTLY
Qty: 3 BOTTLE | Refills: 4 | Status: SHIPPED | OUTPATIENT
Start: 2018-02-05 | End: 2018-06-26 | Stop reason: SDUPTHER

## 2018-03-05 ENCOUNTER — HOSPITAL ENCOUNTER (OUTPATIENT)
Dept: RADIOLOGY | Facility: HOSPITAL | Age: 83
Discharge: HOME | End: 2018-03-05
Attending: INTERNAL MEDICINE
Payer: MEDICARE

## 2018-03-05 ENCOUNTER — OFFICE VISIT (OUTPATIENT)
Dept: INTERNAL MEDICINE | Facility: CLINIC | Age: 83
End: 2018-03-05
Payer: MEDICARE

## 2018-03-05 VITALS
WEIGHT: 105.81 LBS | DIASTOLIC BLOOD PRESSURE: 80 MMHG | HEIGHT: 61 IN | SYSTOLIC BLOOD PRESSURE: 122 MMHG | HEART RATE: 69 BPM | TEMPERATURE: 98 F | RESPIRATION RATE: 20 BRPM | BODY MASS INDEX: 19.98 KG/M2 | OXYGEN SATURATION: 95 %

## 2018-03-05 DIAGNOSIS — N63.0 BREAST MASS: ICD-10-CM

## 2018-03-05 DIAGNOSIS — Z12.39 BREAST CANCER SCREENING: Primary | ICD-10-CM

## 2018-03-05 DIAGNOSIS — N63.0 BREAST MASS: Primary | ICD-10-CM

## 2018-03-05 DIAGNOSIS — M79.9 SOFT TISSUE DISORDER: ICD-10-CM

## 2018-03-05 DIAGNOSIS — N63.10 BREAST MASS, RIGHT: Primary | ICD-10-CM

## 2018-03-05 PROCEDURE — 99999 PR PBB SHADOW E&M-EST. PATIENT-LVL IV: CPT | Mod: PBBFAC,,, | Performed by: PHYSICIAN ASSISTANT

## 2018-03-05 PROCEDURE — 99213 OFFICE O/P EST LOW 20 MIN: CPT | Mod: S$GLB,,, | Performed by: PHYSICIAN ASSISTANT

## 2018-03-05 RX ORDER — ASPIRIN 81 MG/1
81 TABLET ORAL
COMMUNITY
End: 2018-06-26

## 2018-03-05 NOTE — PROGRESS NOTES
Subjective:       Patient ID: Martha Terrell is a 89 y.o.W/ female.    Chief Complaint: Breast Mass (R after injury)    HPI         She comes in today by herself and has the above problem.  Apparently she has been seen for the same problem by her PCP Dr. Lugo and was ordered a mammogram and breast ultrasound but for some reason the technician in radiology wanted me to see her today before they would do the test.        The patient was outdoors about 5 weeks ago when she bent over to pick something up off the ground and her right breast was poked by something sticking up that she thought was metal.  She says it hurt instantaneously but didn't keep hurting.  It never cut the skin or abraded it.  Then she noticed a lump building in size over the last 4 weeks until it's the size that it is today.  It is still not painful or sore and it doesn't itch burn or hurt.  There's been no leakage from the nipple.  She's never had any breast problems in the past.  It's been years since she had a mammogram.    Review of Systems    Otherwise negative concerning the ENDOCRINE, INTEGUMENT, IMMUNE system review.    Objective:      Physical Exam    LEFT BREAST: Atrophic and there is no fibrocystic breast disease or lumps palpable in the left breast whatsoever.  Left axilla is free of nodes.  RIGHT BREAST: There is a large mass that is approximately 4 cm x 5 cm diameter and somewhat irregular in shape.  It's about 2.5 cm tall from the chest wall outward.  I don't really see any bruising present.  There are no cuts or abrasions on the breast itself.  She has no leakage from the nipple.  In the right axilla is completely normal without mass or adenopathy.  This is probably hematoma of the right breast.  Assessment:(       1. Breast mass, right        Plan:     1.  The plan is to do a diagnostic mammogram and also breast ultrasound tomorrow about 2:30 PM.  I tried to reassure the patient that this is probably just a hematoma and not to  worry until we get the test done.

## 2018-03-06 ENCOUNTER — TELEPHONE (OUTPATIENT)
Dept: INTERNAL MEDICINE | Facility: CLINIC | Age: 83
End: 2018-03-06

## 2018-03-06 NOTE — TELEPHONE ENCOUNTER
----- Message from Soy Beaver sent at 3/6/2018  1:36 PM CST -----  Contact: pt  She's calling in regards to being worked back into the schedule for a procedure for (breast surgey), pls advise, [phone] (home)

## 2018-03-07 ENCOUNTER — TELEPHONE (OUTPATIENT)
Dept: RADIOLOGY | Facility: HOSPITAL | Age: 83
End: 2018-03-07

## 2018-03-08 ENCOUNTER — HOSPITAL ENCOUNTER (OUTPATIENT)
Dept: RADIOLOGY | Facility: HOSPITAL | Age: 83
Discharge: HOME | End: 2018-03-08
Attending: INTERNAL MEDICINE
Payer: MEDICARE

## 2018-03-08 VITALS — HEIGHT: 61 IN | WEIGHT: 105 LBS | BODY MASS INDEX: 19.83 KG/M2

## 2018-03-08 DIAGNOSIS — N63.0 BREAST MASS: ICD-10-CM

## 2018-03-08 PROCEDURE — 77066 DX MAMMO INCL CAD BI: CPT | Mod: 26,,, | Performed by: RADIOLOGY

## 2018-03-08 PROCEDURE — 77062 BREAST TOMOSYNTHESIS BI: CPT | Mod: 26,,, | Performed by: RADIOLOGY

## 2018-03-08 PROCEDURE — 77062 BREAST TOMOSYNTHESIS BI: CPT | Mod: TC,PO

## 2018-03-08 PROCEDURE — 76642 ULTRASOUND BREAST LIMITED: CPT | Mod: TC,PO,RT

## 2018-03-08 PROCEDURE — 76642 ULTRASOUND BREAST LIMITED: CPT | Mod: 26,RT,, | Performed by: RADIOLOGY

## 2018-03-09 ENCOUNTER — TELEPHONE (OUTPATIENT)
Dept: INTERNAL MEDICINE | Facility: CLINIC | Age: 83
End: 2018-03-09

## 2018-03-09 ENCOUNTER — TELEPHONE (OUTPATIENT)
Dept: RADIOLOGY | Facility: HOSPITAL | Age: 83
End: 2018-03-09

## 2018-03-09 NOTE — TELEPHONE ENCOUNTER
----- Message from Maribeth Thompson sent at 3/9/2018  3:45 PM CST -----  Contact: patient  Returning your call regarding changes in her appointment with mammogram. Please call patient today ASAP URGENT!! @ [phone]. Thanks, michelle

## 2018-03-13 NOTE — PROGRESS NOTES
Patient ID: Martha Terrell is a 89 y.o. female.    Chief Complaint: Breast Mass      HPI: Patient presents for the evaluation of the right breast mass. Patient thinks she leaned over and hit her breast about 4 months ago and felt a pain for a few minutes- did not think anything about this until she felt a mass in her breast about one week ago. Pt relates mass being large, not painful and denies any nipple discharge. Denies any bruising at any time. Describes area as feeling heavy. Thinks is enlarging over the past week or getting heavier.     Denies any prior abnormal mammograms or breast surgeries.     FH: no breast cancer    Menarche at 13 y/o  - miscarriage - 1  LMP- hyst mid 40's  Took estrogen for about 10 years  No radiation to the neck or chest wall    Last aspirin was yesterday   Last vitamin E one week ago      Review of Systems   Constitutional: Positive for unexpected weight change (pt relates 25 # wt loss over past couple of months). Negative for appetite change.   HENT: Negative.    Eyes: Negative.  Negative for visual disturbance.   Respiratory: Negative.  Negative for cough and shortness of breath.    Cardiovascular: Negative.  Negative for chest pain.   Gastrointestinal: Negative.  Negative for abdominal pain and diarrhea.        No reflux   Endocrine: Negative.    Genitourinary: Negative.  Negative for frequency.        Intermittent incontinence of urine and bowel lately but not consistent.    Musculoskeletal: Positive for arthralgias. Negative for back pain.        Back and hip pain over the past couple of months. Did not have this until recently.    Skin: Negative.  Negative for rash.   Allergic/Immunologic: Negative.    Neurological: Negative.  Negative for headaches.        States feels unsteady on her feet. Unsure if dizzy or if having trouble focusing eyes.    Hematological: Negative.  Negative for adenopathy.   Psychiatric/Behavioral: Negative.  The patient is not nervous/anxious.     Breast: Pt denies any breast pain or nipple discharge. Noted right breast mass a couple of weeks ago. Pt relates possible trauma about 4 months ago but did not recall any bruising or pain.    Current Outpatient Prescriptions   Medication Sig Dispense Refill    ascorbic acid (VITAMIN C) 500 MG tablet Take 500 mg by mouth once daily.      brimonidine-timolol (COMBIGAN) 0.2-0.5 % Drop Place 1 drop into both eyes 2 (two) times daily. PLEASE DISPENSE A 90 DAY SUPPLY 5 mL 4    calcium-vitamin D tablet 600 mg-200 units Take 1 tablet by mouth once daily.      hydrOXYzine HCl (ATARAX) 10 MG Tab Take 1-2 tablets (10-20 mg total) by mouth 3 (three) times daily as needed (anxiety--- PLEASE INCLUDE ON RX, disregard other rx.). 40 tablet 3    latanoprost 0.005 % ophthalmic solution Place 1 drop into both eyes every evening. PLEASE DISPENSE A 3 MONTH SUPPLY 3 Bottle 4    VITAMIN B COMP W-C/ZINC (B COMPLEX-C-E-ZN) CpSR Take 1 capsule by mouth.      vitamin E 400 UNIT capsule Take 400 Units by mouth once daily.      aspirin (ECOTRIN) 81 MG EC tablet Take 81 mg by mouth as needed for Pain.      lutein 6 mg Cap Take 1 capsule by mouth once daily.       meclizine (ANTIVERT) 12.5 mg tablet Take 1 tablet (12.5 mg total) by mouth 3 (three) times daily as needed. (Patient taking differently: Take 12.5 mg by mouth as needed. ) 40 tablet 2    sertraline (ZOLOFT) 50 MG tablet TAKE ONE-HALF TO ONE TABLET BY MOUTH IN THE EVENING 90 tablet 3     No current facility-administered medications for this visit.        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 OTTO    SLT OS  4/25/13    TONSILLECTOMY      at age 6       Family History   Problem Relation Age of Onset    Cancer Father     Amblyopia Neg Hx     Blindness Neg Hx     Cataracts Neg Hx     Diabetes Neg Hx     Glaucoma Neg Hx     Hypertension Neg Hx     Macular degeneration Neg Hx     Retinal detachment Neg Hx     Strabismus Neg Hx     Stroke Neg Hx     Thyroid disease Neg Hx        Social History     Social History    Marital status:      Spouse name: N/A    Number of children: N/A    Years of education: N/A     Occupational History    Not on file.     Social History Main Topics    Smoking status: Former Smoker     Packs/day: 0.25     Years: 30.00     Quit date: 1/27/1966    Smokeless tobacco: Never Used    Alcohol use No    Drug use: No    Sexual activity: No     Other Topics Concern    Not on file     Social History Narrative    Lives alone, 1 dog.       Vitals:    03/14/18 1244   BP: 106/62   Pulse: 62   Resp: 20       Physical Exam   Constitutional: She is oriented to person, place, and time. She appears cachectic.   HENT:   Head: Normocephalic and atraumatic.   Right Ear: External ear normal.   Left Ear: External ear normal.   Mouth/Throat: No oropharyngeal exudate.   Eyes: Conjunctivae and EOM are normal. Pupils are equal, round, and reactive to light. Right eye exhibits no discharge. Left eye exhibits no discharge. No scleral icterus.   Neck: Normal range of motion. Neck supple. No thyromegaly present.   Cardiovascular: Normal rate, regular rhythm and normal heart sounds.    Pulmonary/Chest: Effort normal and breath sounds normal. Right breast exhibits no inverted nipple, no mass, no nipple  discharge, no skin change and no tenderness. Left breast exhibits no inverted nipple, no mass, no nipple discharge, no skin change and no tenderness.       Abdominal: Soft. Bowel sounds are normal.   Musculoskeletal: Normal range of motion. She exhibits no edema.        Right shoulder: She exhibits no crepitus and normal strength.   Lymphadenopathy:        Head (right side): No submental, no submandibular, no tonsillar, no preauricular, no posterior auricular and no occipital adenopathy present.        Head (left side): No submental, no submandibular, no tonsillar, no preauricular, no posterior auricular and no occipital adenopathy present.     She has no cervical adenopathy.        Right cervical: No superficial cervical and no posterior cervical adenopathy present.       Left cervical: No superficial cervical and no posterior cervical adenopathy present.     She has axillary adenopathy.        Right axillary: Pectoral and lateral adenopathy present.        Right: No supraclavicular adenopathy present.        Left: No supraclavicular adenopathy present.   Neurological: She is alert and oriented to person, place, and time. She has normal reflexes.   Skin: Skin is warm and dry. No rash noted. No erythema. No pallor.   Psychiatric: She has a normal mood and affect. Her behavior is normal. Judgment and thought content normal.       Imaging:   Result:   Mammo Digital Diagnostic Bilat with Tomosynthesis_CAD  US Breast Right Limited     History:  Patient is 89 y.o. and is seen for a diagnostic mammogram.     Films Compared:  Prior images (if available) were compared.     Findings:  This procedure was performed using tomosynthesis.  Computer-aided detection was utilized in the interpretation of this examination.  The breasts have scattered areas of fibroglandular density.      Right  Mammo Digital Diagnostic Bilat with Tomosynthesis_CAD  There is a mass seen in the upper outer quadrant of the right breast.       There are  inflammatory changes including extensive skin and trabecular thickening with diffusely increased breast density on the right. This involves a majority of the outer upper right breast.  There is also a more focal area of increased density/mass also seen in the upper outer right breast.     US Breast Right Limited  There is a mass seen in the right breast at 9 o'clock which is vascular.  The mass is ill defined with irregular borders and hypoechoic with shadowing.  Exact measurements are difficult but the mass measures on the order of 50 x 34 x 24 mm.      Left  Mammo Digital Diagnostic Bilat with Tomosynthesis_CAD  There is no evidence of suspicious masses, calcifications, or other abnormal findings.     Impression:  Right  Mass: Right breast mass at the upper outer position. Assessment: 4 - Suspicious finding. Biopsy is recommended.      Left  There is no mammographic or sonographic evidence of malignancy.     BI-RADS Category:   Overall: 4 - Suspicious     Recommendation:  Biopsy is recommended.        Assessment & Plan:  Breast mass  -     US Breast Biopsy with Imaging 1st site R; Future; Expected date: 03/13/2018    Abnormal mammogram  -     US Breast Biopsy with Imaging 1st site R; Future; Expected date: 03/13/2018    1.  Patient noted palpable right breast mass couple weeks ago and feels as if it is becoming heavier and larger.  2.  Large visible mass right breast mid to lower outer quadrant.  Right axillary palpable masses.  Numerous.  Findings worrisome for metastatic breast cancer.  3.  Breast imaging on March 8, 2018 increased density right breast upper outer quadrant skin thickening noted.  Ultrasound revealed ill-defined irregular margins and mass 50 x 34 x 24 mm.  Biopsy recommended.  4.  Explained clinical and imaging findings to patient and relayed concern for malignancy.  Patient verbalized understanding and states that she suspected that it could be cancer.  She is perplexed at [place] it suddenly  developed or that she was unable to identify it sooner.  5.  Explained to patient the need to obtain a tissue diagnosis of the mass in the right breast and one of the axillary masses on the right side.  Ultrasound-guided core biopsy of the right breast and right axilla is recommended.  Risks versus benefits of the procedure were discussed in detail.  Risk of bleeding, infection, bruising, pain, and the need for further surgical intervention being possible if pathology does indeed returned cancer.  Explained to patient if it is cancer treatment recommendations will be made by the oncologist and surgeon.  We have scheduled her at the [facility] for March 19 at 12:30 PM.  Patient plans to take a cab to the procedure since she has no family or relatives to depend on in this area.  Specific line by line instructions were typed up and given to the patient specifying when to stop her aspirin and vitamins, preoperative perioperative and postoperative instructions, went to call for her cab, and a follow-up appointment has been scheduled with  on March 27, 2018.  All of her direction's and orders have been placed in a large envelope for her to carry with her to the procedure.  States regulated information regarding options for breast cancer treatment were given to the patient as well.  6.  Patient's gait was a little wobbly, patient was escorted to the first floor where cab was called to pick her up.  Explained to patient that the oncologist will most likely do further testing to evaluate her recent aches and pains to make sure that there is no metastatic disease.  Patient verbalizes understanding, then a few minutes later will ask the same question.  We will try to call the patient the morning of her biopsy to remind her to call a cab and to get ready for the appointment.

## 2018-03-14 ENCOUNTER — OFFICE VISIT (OUTPATIENT)
Dept: HEMATOLOGY/ONCOLOGY | Facility: CLINIC | Age: 83
End: 2018-03-14
Payer: MEDICARE

## 2018-03-14 VITALS
SYSTOLIC BLOOD PRESSURE: 106 MMHG | HEIGHT: 60 IN | RESPIRATION RATE: 20 BRPM | BODY MASS INDEX: 20.91 KG/M2 | OXYGEN SATURATION: 95 % | HEART RATE: 62 BPM | DIASTOLIC BLOOD PRESSURE: 62 MMHG | WEIGHT: 106.5 LBS

## 2018-03-14 DIAGNOSIS — R92.8 ABNORMAL MAMMOGRAM: ICD-10-CM

## 2018-03-14 DIAGNOSIS — R63.4 WEIGHT LOSS: ICD-10-CM

## 2018-03-14 DIAGNOSIS — R22.31 AXILLARY MASS, RIGHT: ICD-10-CM

## 2018-03-14 DIAGNOSIS — N63.0 BREAST MASS: Primary | ICD-10-CM

## 2018-03-14 PROCEDURE — 99999 PR PBB SHADOW E&M-EST. PATIENT-LVL V: CPT | Mod: PBBFAC,,, | Performed by: NURSE PRACTITIONER

## 2018-03-14 PROCEDURE — 99204 OFFICE O/P NEW MOD 45 MIN: CPT | Mod: S$GLB,,, | Performed by: NURSE PRACTITIONER

## 2018-03-14 NOTE — PATIENT INSTRUCTIONS
No anti-inflammatory medications 5-7 days prior to biopsy: Hold Aleve, Aspirin, Ibuprofen and prescription anti-inflammatory medications. Hold vitamin E and fish oil since they can thin the blood.     May eat breakfast and take medications on day of procedure.    After procedure use ice pack over site intermittently and a good support bra for 24 hours.    No vigorous activity for 48 hours after biopsy that would cause extreme breast movement.     Return to clinic in one week for pathology results.

## 2018-03-14 NOTE — LETTER
March 14, 2018      Nichelle Lugo MD  [address] [facility] [place] [address]           [facility] - Hematology Oncology  [address] [place] [address]  Phone: [phone]  Fax: [phone]          Patient: Martha Terrell   MR Number: [id number]   YOB: 1921   Date of Visit: 3/14/2018       Dear Dr. Nichelle Lugo:    Thank you for referring Martha Terrell to me for evaluation. Attached you will find relevant portions of my assessment and plan of care.    If you have questions, please do not hesitate to call me. I look forward to following Martha Terrell along with you.    Sincerely,    Shannon Pratt NP    Enclosure  CC:  No Recipients    If you would like to receive this communication electronically, please contact [email or web address] or [phone] to request more information on [place] Link access.    For providers and/or their staff who would like to refer a patient to [facility], please contact us through our one-stop-shop provider referral line, [facility], at [phone].    If you feel you have received this communication in error or would no longer like to receive these types of communications, please e-mail [email or web address]

## 2018-03-16 RX ORDER — TOLTERODINE 4 MG/1
CAPSULE, EXTENDED RELEASE ORAL
Qty: 30 CAPSULE | Refills: 9 | Status: SHIPPED | OUTPATIENT
Start: 2018-03-16 | End: 2018-06-26

## 2018-03-16 NOTE — TELEPHONE ENCOUNTER
----- Message from Aidee Pabon sent at 3/16/2018  2:24 PM CDT -----  Contact: Patient   Patient stated that is having a mammogram done on 3/19/18 at [facility], Please call her at [phone].    Thanks  td

## 2018-03-16 NOTE — TELEPHONE ENCOUNTER
----- Message from Priyanka Verduzco sent at 3/16/2018  8:56 AM CDT -----  Contact: Pt  Pt request a call from the nurse to let the Dr know she will be going into a hospital Monday at [facility] and wants to discuss, please contact  the pt at [phone]

## 2018-03-16 NOTE — TELEPHONE ENCOUNTER
Pt called just to inform that she wanted to let us know that she was have a mammogram done on Monday at [facility]./[email or web address]

## 2018-03-19 ENCOUNTER — NURSE TRIAGE (OUTPATIENT)
Dept: ADMINISTRATIVE | Facility: CLINIC | Age: 83
End: 2018-03-19

## 2018-03-20 NOTE — TELEPHONE ENCOUNTER
"  Reason for Disposition   Health Information question, no triage required and triager able to answer question    Answer Assessment - Initial Assessment Questions  1. REASON FOR CALL or QUESTION: "What is your reason for calling today?" or "How can I best help you?" or "What question do you have that I can help answer?"      Pt had breast biopsy today at non [facility] facility. Breast is sore/slightly puffy. Forgot to ask if she could wear a bra to sleep in and cannot find it on discharge orders.    Protocols used: ST INFORMATION ONLY CALL-A-AH    "

## 2018-03-22 ENCOUNTER — DOCUMENTATION ONLY (OUTPATIENT)
Dept: SURGERY | Facility: CLINIC | Age: 83
End: 2018-03-22

## 2018-03-22 NOTE — PROGRESS NOTES
Called and notified patient of pathology results from recent core biopsy of the right breast and right exam lung performed at [facility] on March 19, 2018.    Right axilla mass ultrasound-guided core: Metastatic carcinoma, consistent with mammary primary.  No lymphoid tissue identified.    Right breast mass at 9:00 1 cm from nipple, ultrasound-guided core biopsy-invasive carcinoma of no special type (ductal, not otherwise specified).    Comment: The invasive carcinoma appears high-grade in the specimen and measures up to 1.1 cm in greatest dimension no in situ component identified.    Estrogen receptor negative and progesterone receptor negative, Herceptin equivocal-fish testing being performed.    Explained test results to patient over the phone.  Patient verbalized understanding and has made plans to keep her appointment on Tuesday with Dr. Munguia.  Patient verbalized understanding of findings and stated that she was okay.    Pathology copy given to Dr. Munguia.

## 2018-03-27 ENCOUNTER — SOCIAL WORK (OUTPATIENT)
Dept: HEMATOLOGY/ONCOLOGY | Facility: CLINIC | Age: 83
End: 2018-03-27

## 2018-03-27 ENCOUNTER — INITIAL CONSULT (OUTPATIENT)
Dept: HEMATOLOGY/ONCOLOGY | Facility: CLINIC | Age: 83
End: 2018-03-27
Payer: MEDICARE

## 2018-03-27 ENCOUNTER — LAB VISIT (OUTPATIENT)
Dept: LAB | Facility: HOSPITAL | Age: 83
End: 2018-03-27
Attending: INTERNAL MEDICINE
Payer: MEDICARE

## 2018-03-27 VITALS
BODY MASS INDEX: 20.86 KG/M2 | SYSTOLIC BLOOD PRESSURE: 122 MMHG | DIASTOLIC BLOOD PRESSURE: 74 MMHG | OXYGEN SATURATION: 95 % | RESPIRATION RATE: 18 BRPM | HEART RATE: 67 BPM | WEIGHT: 106.25 LBS | HEIGHT: 60 IN | TEMPERATURE: 98 F

## 2018-03-27 DIAGNOSIS — C79.9 METASTASIS FROM BREAST CANCER: ICD-10-CM

## 2018-03-27 DIAGNOSIS — C50.919 METASTASIS FROM BREAST CANCER: Primary | ICD-10-CM

## 2018-03-27 DIAGNOSIS — C50.919 METASTASIS FROM BREAST CANCER: ICD-10-CM

## 2018-03-27 DIAGNOSIS — C50.911 RIGHT BREAST CANCER WITH T3 TUMOR, >5 CM IN GREATEST DIMENSION: ICD-10-CM

## 2018-03-27 DIAGNOSIS — C79.9 METASTASIS FROM BREAST CANCER: Primary | ICD-10-CM

## 2018-03-27 LAB
ALBUMIN SERPL BCP-MCNC: 3.6 G/DL
ALP SERPL-CCNC: 70 U/L
ALT SERPL W/O P-5'-P-CCNC: 11 U/L
ANION GAP SERPL CALC-SCNC: 9 MMOL/L
ANISOCYTOSIS BLD QL SMEAR: ABNORMAL
AST SERPL-CCNC: 18 U/L
BASOPHILS # BLD AUTO: 0.03 K/UL
BASOPHILS NFR BLD: 0.4 %
BILIRUB SERPL-MCNC: 0.4 MG/DL
BUN SERPL-MCNC: 18 MG/DL
CALCIUM SERPL-MCNC: 9.5 MG/DL
CHLORIDE SERPL-SCNC: 101 MMOL/L
CO2 SERPL-SCNC: 28 MMOL/L
CREAT SERPL-MCNC: 0.8 MG/DL
DIFFERENTIAL METHOD: ABNORMAL
EOSINOPHIL # BLD AUTO: 0.2 K/UL
EOSINOPHIL NFR BLD: 2.3 %
ERYTHROCYTE [DISTWIDTH] IN BLOOD BY AUTOMATED COUNT: 14.7 %
EST. GFR  (AFRICAN AMERICAN): >60 ML/MIN/1.73 M^2
EST. GFR  (NON AFRICAN AMERICAN): >60 ML/MIN/1.73 M^2
GLUCOSE SERPL-MCNC: 100 MG/DL
HCT VFR BLD AUTO: 38.3 %
HGB BLD-MCNC: 12.2 G/DL
HOWELL-JOLLY BOD BLD QL SMEAR: ABNORMAL
LYMPHOCYTES # BLD AUTO: 1.6 K/UL
LYMPHOCYTES NFR BLD: 21 %
MCH RBC QN AUTO: 29.5 PG
MCHC RBC AUTO-ENTMCNC: 31.9 G/DL
MCV RBC AUTO: 93 FL
MONOCYTES # BLD AUTO: 0.7 K/UL
MONOCYTES NFR BLD: 9.2 %
NEUTROPHILS # BLD AUTO: 5 K/UL
NEUTROPHILS NFR BLD: 68 %
PLATELET # BLD AUTO: 225 K/UL
PLATELET BLD QL SMEAR: ABNORMAL
PMV BLD AUTO: 9.6 FL
POLYCHROMASIA BLD QL SMEAR: ABNORMAL
POTASSIUM SERPL-SCNC: 4.5 MMOL/L
PROT SERPL-MCNC: 7 G/DL
RBC # BLD AUTO: 4.13 M/UL
SCHISTOCYTES BLD QL SMEAR: ABNORMAL
SODIUM SERPL-SCNC: 138 MMOL/L
WBC # BLD AUTO: 7.39 K/UL

## 2018-03-27 PROCEDURE — 85025 COMPLETE CBC W/AUTO DIFF WBC: CPT | Mod: PO

## 2018-03-27 PROCEDURE — 99999 PR PBB SHADOW E&M-EST. PATIENT-LVL IV: CPT | Mod: PBBFAC,,, | Performed by: INTERNAL MEDICINE

## 2018-03-27 PROCEDURE — 80053 COMPREHEN METABOLIC PANEL: CPT | Mod: PO

## 2018-03-27 PROCEDURE — 36415 COLL VENOUS BLD VENIPUNCTURE: CPT | Mod: PO

## 2018-03-27 PROCEDURE — 99215 OFFICE O/P EST HI 40 MIN: CPT | Mod: S$GLB,,, | Performed by: INTERNAL MEDICINE

## 2018-03-27 NOTE — LETTER
March 27, 2018      Shannon Pratt NP  [address] [facility] [place] [address]           [facility] - Hematology Oncology  [address] [place] [address]  Phone: [phone]  Fax: [phone]          Patient: Martha Terrell   MR Number: [id number]   YOB: 1921   Date of Visit: 3/27/2018       Dear Shannon Pratt:    Thank you for referring Martha Terrell to me for evaluation. Attached you will find relevant portions of my assessment and plan of care.    If you have questions, please do not hesitate to call me. I look forward to following Martha Terrell along with you.    Sincerely,    Bolivar Munguia MD    Enclosure  CC:  No Recipients    If you would like to receive this communication electronically, please contact [email or web address] or [phone] to request more information on [facility] Link access.    For providers and/or their staff who would like to refer a patient to [facility], please contact us through our one-stop-shop provider referral line, [facility] , at [phone].    If you feel you have received this communication in error or would no longer like to receive these types of communications, please e-mail [email or web address]

## 2018-03-27 NOTE — PROGRESS NOTES
OUTPATIENT     MAIN COMPLAINT:  We are asked by Jose Pratt, nurse practitioner, to   evaluate this 89-year-old  lady in regards to her recent finding of   locally advanced breast cancer.    HISTORY OF PRESENT ILLNESS:  This is a 89-year-old  lady who recently   was seen in the General Surgery Department because of a  right breast mass.  She   was unable to tell how long she had it, but it had been at least four months.    Mammogram showed that the mass measured 5.0 x 2.4 x 3.4 cm.    On physical exam, she also had palpable axillary nodes on the right side.  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  The patient is a  who lives alone, has no relatives or friends.    She comes to discuss what to do going forward.    MEDICATIONS:  Amlodipine, benazepril, and Dyazide.    MEDICATIONS:  See MedCard.    PREVIOUS SURGERIES:  Hysterectomy at age 43, tonsillectomy.    SOCIAL HISTORY:  She is a  who lives in [place].  She lives alone.    She says she has no friends or relatives.    She used to smoke for 25 years, averaging half a pack a day.  She denies   significant drinking.  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    FAMILY HISTORY:  Father  of colon cancer.  Mother had diabetes.  No heart   attacks.    PAST MEDICAL HISTORY:  1.  Locally advanced breast cancer on the right side.  2.  Urinary frequency.  3. Hx of tobacco use    REVIEW OF SYSTEMS:  GENERAL:  The patient says she had maintained her weight in the last year.  No   fatigue.  EYES:  No vision problems or excessive lacrimation.  OROPHARYNX:  No difficulty or pain on swallowing.  ENDOCRINE:  No heat or cold intolerance.  LUNGS:  No shortness of breath or coughing.  CARDIOVASCULAR:  No chest pains or palpitation.  GASTROINTESTINAL:  No nausea, vomiting, or diarrhea.  GENITOURINARY:  No  hematuria, kidney stones, or kidney or bladder problems.  NEUROLOGIC:  No headaches or seizures.  PSYCHIATRIC:  No mood swings or depression.    PHYSICAL EXAMINATION:  GENERAL:  She was well groomed.  She interacted normally during the interview.  VITAL SIGNS:  Weight 106 pounds, height 5 feet, pulse 67, respirations 18, and   blood pressure 122/74.  EYES:  No jaundice or paleness.  OROPHARYNX:  No ulcers.  No exudates.  ENDOCRINE:  No palpable thyroid.  LYMPHATICS:  No cervical or supraclavicular adenopathy.  NECK:  No jugular venous distension or masses.  LUNGS:  Clear and well ventilated without rales, wheezes, or rhonchi.  CARDIOVASCULAR:  The heart was rhythmic.  There were no murmurs or gallops.  ABDOMEN:  Soft.  No obvious hepatosplenomegaly, guarding, or rebound.  EXTREMITIES:  There is a large hematoma of the right breast and about half of   the breast is indurated.  There is a palpable 2 cm or so right axillary lymph node.  EXTREMITIES:  No edema.  SKIN:  No blisters or ecchymosis.  NEUROLOGIC:  Coordination, strength, and gait were normal.  PSYCHIATRIC:  Mood was appropriate.  Lucid and oriented x3.    DISCUSSION:  This is a 89-year-old lady who presents with what clinically seems   to be a locally advanced breast cancer.    We will order a CT scan of the chest, abdomen, and pelvis and CBC and CMP.  A   PET scan was not approved by her insurance.  If she still has localized disease,   we will have to decide what the best approach would be and that would include   surgery followed by radiation therapy, radiation therapy alone, and/or expected   observation only.      She is not a candidate for chemotherapy.      RDF/HN  dd: 03/27/2018 11:20:33 (CDT)  td: 03/27/2018 11:52:49 (CDT)  Doc ID   #[id number]  Job ID #[id number]    CC: Nichelle Pratt RN, MSN, NP-C       ADDENDUM:  Referred to the Social work department.  Distress score 4, no need for intervention

## 2018-03-27 NOTE — PROGRESS NOTES
"Met with pt who was recently diagnosed with breast cancer. SW had met with patient in ; at the time she was caring for her spouse, who is now . She lives alone in an apartment. She is from [place], [place] and lived in [place] prior to moving to [place] after [place]. She reports she has "outlived" all of her relatives. She has no children. She got a ride today from her neighbor and has used a cab to get back and forth to other appointments in the past. Her first need expressed was help with transportation.     We discussed that transportation options are limited. Best option may be [employer]'s [facility] to Recovery program. They cannot help this week on such short notice, but SW will make referral and once plan for treatment is determined, we can try to help get her scheduled. This is a volunteer program so it is not entirely reliable that she will have someone who can drive her every time. As such, we assisted pt in applying for a artem to get money to help pay for her transportation.     Finally, pt expressed interest in both [facility] (one-on-one support) and other support groups. Referral also made to [facility] for their support groups and other resources. Pt mentioned being unsteady on her feet. Suggested that she speak to her doctor about this and also suggested considering getting a cane or other mobility device to help.     SW will f/u when pt RTC to discuss further treatment. Will assist with needs as they are determined.  "

## 2018-03-28 ENCOUNTER — HOSPITAL ENCOUNTER (OUTPATIENT)
Dept: RADIOLOGY | Facility: HOSPITAL | Age: 83
Discharge: HOME | End: 2018-03-28
Attending: INTERNAL MEDICINE
Payer: MEDICARE

## 2018-03-28 DIAGNOSIS — C50.911 RIGHT BREAST CANCER WITH T3 TUMOR, >5 CM IN GREATEST DIMENSION: ICD-10-CM

## 2018-03-28 PROCEDURE — 74178 CT ABD&PLV WO CNTR FLWD CNTR: CPT | Mod: 26,,, | Performed by: RADIOLOGY

## 2018-03-28 PROCEDURE — 74178 CT ABD&PLV WO CNTR FLWD CNTR: CPT | Mod: TC,PO

## 2018-03-28 PROCEDURE — 71260 CT THORAX DX C+: CPT | Mod: TC,PO

## 2018-03-28 PROCEDURE — 25500020 PHARM REV CODE 255: Mod: PO | Performed by: INTERNAL MEDICINE

## 2018-03-28 PROCEDURE — 71260 CT THORAX DX C+: CPT | Mod: 26,,, | Performed by: RADIOLOGY

## 2018-03-28 RX ADMIN — IOHEXOL 75 ML: 350 INJECTION, SOLUTION INTRAVENOUS at 01:03

## 2018-03-28 RX ADMIN — IOHEXOL 30 ML: 350 INJECTION, SOLUTION INTRAVENOUS at 11:03

## 2018-03-29 ENCOUNTER — SOCIAL WORK (OUTPATIENT)
Dept: HEMATOLOGY/ONCOLOGY | Facility: CLINIC | Age: 83
End: 2018-03-29

## 2018-03-29 ENCOUNTER — TELEPHONE (OUTPATIENT)
Dept: RADIATION ONCOLOGY | Facility: CLINIC | Age: 83
End: 2018-03-29

## 2018-03-29 ENCOUNTER — OFFICE VISIT (OUTPATIENT)
Dept: HEMATOLOGY/ONCOLOGY | Facility: CLINIC | Age: 83
End: 2018-03-29
Payer: MEDICARE

## 2018-03-29 VITALS
BODY MASS INDEX: 21 KG/M2 | HEIGHT: 60 IN | RESPIRATION RATE: 16 BRPM | TEMPERATURE: 98 F | DIASTOLIC BLOOD PRESSURE: 70 MMHG | HEART RATE: 67 BPM | WEIGHT: 106.94 LBS | OXYGEN SATURATION: 96 % | SYSTOLIC BLOOD PRESSURE: 120 MMHG

## 2018-03-29 DIAGNOSIS — C50.911 RIGHT BREAST CANCER WITH T3 TUMOR, >5 CM IN GREATEST DIMENSION: Primary | ICD-10-CM

## 2018-03-29 PROCEDURE — 99214 OFFICE O/P EST MOD 30 MIN: CPT | Mod: S$GLB,,, | Performed by: INTERNAL MEDICINE

## 2018-03-29 PROCEDURE — 99999 PR PBB SHADOW E&M-EST. PATIENT-LVL III: CPT | Mod: PBBFAC,,, | Performed by: INTERNAL MEDICINE

## 2018-03-29 PROCEDURE — 99499 UNLISTED E&M SERVICE: CPT | Mod: S$GLB,,, | Performed by: INTERNAL MEDICINE

## 2018-03-29 NOTE — TELEPHONE ENCOUNTER
Made call to patient to schedule consult in radiation oncology. Patient stated she would call me back once she gets settled. Left contact number and name for her to call back.

## 2018-03-29 NOTE — PROGRESS NOTES
Subjective:       Patient ID: Martha Terrell is a 89 y.o. female.    Chief Complaint: Follow-up    HPI This is a 89-year-old  lady who  Comes for follow up of her locally advanced breast cancer,.  She recently   was seen in the General Surgery Department because of a  right breast mass.  She   was unable to tell how long she had it, but it had been at least four months.     Mammogram showed that the mass measured 5.0 x 2.4 x 3.4 cm.     On physical exam, she also had palpable axillary nodes on the right side.  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  The patient is a  who lives alone, has no relatives or friends.     She has had Ct scans that show a larhe breast mass and matted axilallary nodes. She ahs some non calcified pulmonary nodules which are all sub-centimeter and of ukknown significance    She comes to discus what to do going forward  MEDICATIONS:  Amlodipine, benazepril, and Dyazide.     MEDICATIONS:  See MedCard.     PREVIOUS SURGERIES:  Hysterectomy at age 43, tonsillectomy.     SOCIAL HISTORY:  She is a  who lives in [place].  She lives alone.    She says she has no friends or relatives.     She used to smoke for 25 years, averaging half a pack a day.  She denies   significant drinking.  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     FAMILY HISTORY:  Father  of colon cancer.  Mother had diabetes.  No heart   attacks.     PAST MEDICAL HISTORY:  1.  Locally advanced breast cancer on the right side.  2.  Urinary frequency.  3. Hx of tobacco use     Review of Systems   Constitutional: Negative.    HENT: Negative.    Eyes: Negative.    Respiratory: Negative.  Negative for cough and wheezing.    Cardiovascular: Negative.  Negative for chest pain.   Gastrointestinal: Negative.    Genitourinary: Negative.    Neurological: Negative.     Psychiatric/Behavioral: Negative.        Objective:      Physical Exam   HENT:   Head: Normocephalic.   Right Ear: External ear normal.   Neck: Normal range of motion.   Cardiovascular: Normal rate.    Pulmonary/Chest: Effort normal and breath sounds normal.   Musculoskeletal: Normal range of motion.   Neurological: She is alert.   Psychiatric: She has a normal mood and affect. Her behavior is normal.       Assessment:       1. Right breast cancer with T3 tumor, >5 cm in greatest dimension        Plan:       This is a difficult case. Under normal circumstances, this would be treated with mariia-adjuvant chemotherapy, followed by surgery and radiation therapy.  She is 89, and all options are full of potential problems. She is not a candidate for any form of chemotherapy.  That leaves surgery/radiation therapy and radiation therapy alone.  I will have her meet with general surgery and Radiation oncology and we will make a consensus decision

## 2018-04-02 ENCOUNTER — OFFICE VISIT (OUTPATIENT)
Dept: SURGERY | Facility: CLINIC | Age: 83
End: 2018-04-02
Payer: MEDICARE

## 2018-04-02 ENCOUNTER — CLINICAL SUPPORT (OUTPATIENT)
Dept: CARDIOLOGY | Facility: CLINIC | Age: 83
End: 2018-04-02
Attending: SURGERY
Payer: MEDICARE

## 2018-04-02 VITALS
WEIGHT: 107.56 LBS | DIASTOLIC BLOOD PRESSURE: 64 MMHG | HEIGHT: 60 IN | SYSTOLIC BLOOD PRESSURE: 110 MMHG | TEMPERATURE: 98 F | HEART RATE: 62 BPM | BODY MASS INDEX: 21.12 KG/M2

## 2018-04-02 DIAGNOSIS — I70.0 ATHEROSCLEROSIS OF AORTA: ICD-10-CM

## 2018-04-02 DIAGNOSIS — I70.0 ATHEROSCLEROSIS OF AORTA: Primary | ICD-10-CM

## 2018-04-02 DIAGNOSIS — I11.0 HYPERTENSIVE HEART DISEASE WITH CONGESTIVE HEART FAILURE: ICD-10-CM

## 2018-04-02 LAB
ESTIMATED PA SYSTOLIC PRESSURE: 31.94
RETIRED EF AND QEF - SEE NOTES: 60 (ref 55–65)
TRICUSPID VALVE REGURGITATION: NORMAL

## 2018-04-02 PROCEDURE — 99499 UNLISTED E&M SERVICE: CPT | Mod: S$PBB,,, | Performed by: SURGERY

## 2018-04-02 PROCEDURE — 99999 PR PBB SHADOW E&M-EST. PATIENT-LVL III: CPT | Mod: PBBFAC,,, | Performed by: SURGERY

## 2018-04-02 PROCEDURE — 99214 OFFICE O/P EST MOD 30 MIN: CPT | Mod: S$GLB,,, | Performed by: SURGERY

## 2018-04-02 PROCEDURE — 93307 TTE W/O DOPPLER COMPLETE: CPT | Mod: S$GLB,,, | Performed by: NUCLEAR MEDICINE

## 2018-04-02 NOTE — PROGRESS NOTES
SW presence requested by MD in exam room to discuss treatment plan. MD reviewed options with pt while SW listened. Pt has discussed previously her concerns with surgery, namely having no one to car for her dog at home if she would be admitted to the hospital. MS explained she is at risk with any of the treatment options in her delicate condition. Pt further explained to SW that even though she is 89 (soon to be in May), she feels she wants to live. Pt indicated some may find this selfish given her life experience thus far, but she reported she is not ready to stop living and has so much left to learn. Pt is open to meeting with general surgery and radiation to discuss her options further. We discussed transportation options as this continues to be her biggest barrier to treatment. SW explained that [facility] is still an option if her appts are scheduled at least 4 business days in the future, but SW reminded pt that the rides are not 100% guaranteed as it is a volunteer program. SW reminded pt that she should have an internal artem (a check worth $500) coming to her home address to help cover cab fares or pay her neighbor to bring her to her appts. SW explained that if she were to decide on radiation, SW could assist her with daily transportation services. Pt will inform SW of her decision after her two consults. Later, clinic nurse can to tell SW of pt's consult being on Monday. SW explained that is not enough time for ACS unfortunately. Nurse discussed with pt in lobby and neighbor will be able to bring pt. SW will f/u with pt next week to determine tx plan as it pertains to transportation needs.

## 2018-04-02 NOTE — PROGRESS NOTES
Patient ID: Martha Terrell is a 89 y.o. female.    Right breast cancer    Chief Complaint: Consult (breast mass)      HPI:  She states she underwent mammography.  There was she was found to have a large right breast mass.  This led to a stereotactic biopsy which showed a infiltrating carcinoma, the estrogen and progesterone receptors are negative.    Patient was seen by oncology, and they feel she is not a candidate for chemotherapy.  Given her receptor status is no role for estrogen blockade.    The patient states she was unaware of the mass in her breast, she did however notice that her right breast was larger than her left.    She presents now to discuss surgical treatment for a right breast cancer with positive axillary lymph nodes.  She did cancel an appointment with radiation oncology for today        Review of Systems   Constitutional: Negative for appetite change, chills, fatigue, fever and unexpected weight change.   HENT: Negative for hearing loss and rhinorrhea.    Eyes: Negative for visual disturbance.   Respiratory: Negative for apnea, cough, shortness of breath and wheezing.    Cardiovascular: Negative for chest pain and palpitations.   Gastrointestinal: Negative for abdominal distention, abdominal pain, blood in stool, constipation, diarrhea, nausea and vomiting.   Genitourinary: Negative for dysuria, frequency and urgency.   Musculoskeletal: Negative for arthralgias and neck pain.   Skin: Negative for rash.        Right breast mass   Neurological: Negative for seizures, weakness, numbness and headaches.   Hematological: Negative for adenopathy. Does not bruise/bleed easily.   Psychiatric/Behavioral: Negative for hallucinations. The patient is not nervous/anxious.        Current Outpatient Prescriptions   Medication Sig Dispense Refill    brimonidine-timolol (COMBIGAN) 0.2-0.5 % Drop Place 1 drop into both eyes 2 (two) times daily. PLEASE DISPENSE A 90 DAY SUPPLY 5 mL 4    latanoprost 0.005 %  ophthalmic solution Place 1 drop into both eyes every evening. PLEASE DISPENSE A 3 MONTH SUPPLY 3 Bottle 4    sertraline (ZOLOFT) 50 MG tablet TAKE ONE-HALF TO ONE TABLET BY MOUTH IN THE EVENING 90 tablet 3    tolterodine (DETROL LA) 4 MG 24 hr capsule TAKE ONE CAPSULE BY MOUTH ONCE DAILY 30 capsule 9    ascorbic acid (VITAMIN C) 500 MG tablet Take 500 mg by mouth once daily.      aspirin (ECOTRIN) 81 MG EC tablet Take 81 mg by mouth as needed for Pain.      calcium-vitamin D tablet 600 mg-200 units Take 1 tablet by mouth once daily.      lutein 6 mg Cap Take 1 capsule by mouth once daily.       VITAMIN B COMP W-C/ZINC (B COMPLEX-C-E-ZN) CpSR Take 1 capsule by mouth.      vitamin E 400 UNIT capsule Take 400 Units by mouth once daily.       No current facility-administered medications for this visit.        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 OTTO    SLT OS  4/25/13    TONSILLECTOMY      at age 6       Family History   Problem Relation Age of Onset    Cancer Father     Amblyopia Neg Hx     Blindness Neg Hx     Cataracts Neg Hx     Diabetes Neg Hx     Glaucoma Neg Hx     Hypertension Neg Hx     Macular degeneration Neg Hx     Retinal detachment Neg Hx      Strabismus Neg Hx     Stroke Neg Hx     Thyroid disease Neg Hx        Social History     Social History    Marital status:      Spouse name: N/A    Number of children: N/A    Years of education: N/A     Occupational History    Not on file.     Social History Main Topics    Smoking status: Former Smoker     Packs/day: 0.25     Years: 30.00     Quit date: 1/27/1966    Smokeless tobacco: Never Used    Alcohol use No    Drug use: No    Sexual activity: No     Other Topics Concern    Not on file     Social History Narrative    Lives alone, 1 dog.       Vitals:    04/02/18 1011   BP: 110/64   Pulse: 62   Temp: 97.9 °F (36.6 °C)       Physical Exam   Constitutional: She is oriented to person, place, and time. No distress.   Frail   HENT:   Head: Normocephalic and atraumatic.   Neck: Normal range of motion. Neck supple.   Cardiovascular: Normal rate, regular rhythm and normal heart sounds.    Pulmonary/Chest: Effort normal and breath sounds normal.   Abdominal: Soft. Bowel sounds are normal. She exhibits no distension. There is no tenderness.   Musculoskeletal: She exhibits no edema.   Neurological: She is alert and oriented to person, place, and time.   Skin: Skin is dry.   Bilateral breast exam  The right shows about an 8 cm firm mobile mass.  There are multiple enlarged right axillary lymph nodes.  There are no skin changes although there is some mild O2 orange changes.    The left breast showed no skin changes, masses, nipple discharge nor axillary adenopathy   Vitals reviewed.    Patient's mammogram and ultrasound were reviewed.    Biopsy results were reviewed, located in the media section of chart  Echocardiogram from 2010 was reviewed  Oncology notes were reviewed    Assessment & Plan:    large node positive right breast cancer.  Hormonal receptor studies are negative.    I recommend a right modified radical mastectomy.  I explained to the patient and her friend who accompanied her, that the goal  of surgery would be to remove the breast cancer and all the enlarged lymph nodes.  I reviewed the risks, benefits and complications.    Before any surgical intervention I recommended that she have an echocardiogram and see cardiology for preoperative evaluation.    I explained that the goal of surgery would be to prevent any adverse skin changes or pain related to the cancer enlarging growing through the skin.  Postoperatively she would need to be evaluated for the role of radiation treatment in this breast cancer.  According to the oncology notes chemotherapy or hormonal therapy is not an option

## 2018-04-03 ENCOUNTER — TELEPHONE (OUTPATIENT)
Dept: RADIATION ONCOLOGY | Facility: CLINIC | Age: 83
End: 2018-04-03

## 2018-04-03 NOTE — TELEPHONE ENCOUNTER
Made f/u call to patient concerning r/s consult appt with radiation oncology. Pt stated she will call back when she has decided on a treatment plan.

## 2018-04-03 NOTE — TELEPHONE ENCOUNTER
Returned call to patient. Pt wanted to know what her appt was for tomorrow at 3pm. Gave patient appt info and confirmed appt time with patient. Pt verbalized understanding

## 2018-04-04 ENCOUNTER — CLINICAL SUPPORT (OUTPATIENT)
Dept: CARDIOLOGY | Facility: CLINIC | Age: 83
End: 2018-04-04
Payer: MEDICARE

## 2018-04-04 ENCOUNTER — OFFICE VISIT (OUTPATIENT)
Dept: CARDIOLOGY | Facility: CLINIC | Age: 83
End: 2018-04-04
Payer: MEDICARE

## 2018-04-04 VITALS
BODY MASS INDEX: 20.62 KG/M2 | WEIGHT: 105 LBS | DIASTOLIC BLOOD PRESSURE: 72 MMHG | SYSTOLIC BLOOD PRESSURE: 130 MMHG | HEART RATE: 62 BPM | HEIGHT: 60 IN

## 2018-04-04 DIAGNOSIS — Z76.89 ESTABLISHING CARE WITH NEW DOCTOR, ENCOUNTER FOR: ICD-10-CM

## 2018-04-04 DIAGNOSIS — I11.0 HYPERTENSIVE HEART DISEASE WITH HEART FAILURE: ICD-10-CM

## 2018-04-04 DIAGNOSIS — I77.9 BILATERAL CAROTID ARTERY DISEASE: ICD-10-CM

## 2018-04-04 DIAGNOSIS — I11.0 HYPERTENSIVE HEART DISEASE WITH HEART FAILURE: Primary | ICD-10-CM

## 2018-04-04 DIAGNOSIS — Z01.810 PRE-OPERATIVE CARDIOVASCULAR EXAMINATION: ICD-10-CM

## 2018-04-04 PROCEDURE — 99204 OFFICE O/P NEW MOD 45 MIN: CPT | Mod: S$GLB,,, | Performed by: NUCLEAR MEDICINE

## 2018-04-04 PROCEDURE — 99499 UNLISTED E&M SERVICE: CPT | Mod: S$GLB,,, | Performed by: NUCLEAR MEDICINE

## 2018-04-04 PROCEDURE — 99999 PR PBB SHADOW E&M-EST. PATIENT-LVL III: CPT | Mod: PBBFAC,,, | Performed by: NUCLEAR MEDICINE

## 2018-04-04 NOTE — PROGRESS NOTES
Subjective:   Patient ID:  Martha Terrell is a 89 y.o. female who presents for evaluation of Pre-op Exam (Mastectomy)      HPI REFERRED BY SURGICAL CLINIC- DR LOZANO FOR PRE OP CARD EVALUATION  LEFT BREAST MASS, REMOVAL-CA  ECHO WAS ORDERED - AN REPORTED AS MILD AORTIC ROOT DILATATION.  NORMAL LV SYSTOLIC AND DIASTOLIC FUNCTION,  LV EF - 60-65%. NO STRUCTURAL VALVE ABNORMALITIES. NO PERICARDIAL EFF  CT SCAN OF CHEST, DID NO DEMONSTRATE ABNORMALITIES IN THE THORACIC AORTA, NO CALCIFICATIONS OF AORTA OR CORONARIES  NO HX OF CAD- ANGINA  OR ACS- AMI  NO HX OF CHF, CARDIOMEGALY, MYOPERICARDITIS OR CMP  NO HX OF HEART MURMUR OF VALVULAR HEART DISEASE  NO HX OF TIA OR STROKE  NO HX OF CARD ARRHYTHMIAS. NO HX OF SYNCOPE  NO HX OF INTERMITTENT CLAUDICATION OR PAD  NO HX OF DVT OR PE  NO HX OF HTN, DM, DYSLIPIDEMIA,NO OBESITY  NO CKD. NO THYROID DISEASE  NO HX OF ABNORMAL BLEEDING OR CLOTTING DISORDERS  Review of Systems   Constitution: Negative for chills, fever, weakness, night sweats, weight gain and weight loss.   HENT: Negative for nosebleeds.    Eyes: Negative for blurred vision, double vision and visual disturbance.   Cardiovascular: Negative for chest pain, dyspnea on exertion, irregular heartbeat, leg swelling, orthopnea, palpitations, paroxysmal nocturnal dyspnea and syncope.   Respiratory: Negative for cough, hemoptysis and wheezing.    Endocrine: Negative for polydipsia and polyuria.   Hematologic/Lymphatic: Does not bruise/bleed easily.   Skin: Negative for rash.   Musculoskeletal: Negative for joint pain, joint swelling, muscle weakness and myalgias.   Gastrointestinal: Negative for abdominal pain, hematemesis, jaundice and melena.   Genitourinary: Negative for dysuria, hematuria and nocturia.   Neurological: Negative for dizziness, focal weakness, headaches and sensory change.   Psychiatric/Behavioral: Negative for depression. The patient does not have insomnia and is not nervous/anxious.           Objective:     Physical Exam   Constitutional: She is oriented to person, place, and time. She appears well-developed. No distress.   HENT:   Head: Normocephalic.   Eyes: Conjunctivae are normal. Pupils are equal, round, and reactive to light. No scleral icterus.   Neck: Normal range of motion. Neck supple. Normal carotid pulses, no hepatojugular reflux and no JVD present. Carotid bruit is not present. No edema present. No thyroid mass and no thyromegaly present.   Cardiovascular: Normal rate, regular rhythm, S1 normal, S2 normal, normal heart sounds and intact distal pulses.  PMI is not displaced.  Exam reveals no gallop and no friction rub.    No murmur heard.  Pulses:       Carotid pulses are 2+ on the right side, and 2+ on the left side.       Radial pulses are 2+ on the right side, and 2+ on the left side.        Femoral pulses are 2+ on the right side, and 2+ on the left side.       Popliteal pulses are 2+ on the right side, and 2+ on the left side.        Dorsalis pedis pulses are 2+ on the right side, and 2+ on the left side.        Posterior tibial pulses are 2+ on the right side, and 2+ on the left side.   Pulmonary/Chest: Effort normal and breath sounds normal. She has no wheezes. She has no rales. She exhibits no tenderness.   Abdominal: Soft. Bowel sounds are normal. She exhibits no pulsatile midline mass and no mass. There is no hepatosplenomegaly. There is no tenderness.   Musculoskeletal: Normal range of motion. She exhibits no edema or tenderness.        Cervical back: Normal.        Thoracic back: Normal.        Lumbar back: Normal.   Lymphadenopathy:     She has no cervical adenopathy.     She has no axillary adenopathy.        Right: No supraclavicular adenopathy present.        Left: No supraclavicular adenopathy present.   Neurological: She is alert and oriented to person, place, and time. She has normal strength and normal reflexes. No sensory deficit. Gait normal.   Skin: Skin is  warm. No rash noted. No cyanosis. No pallor. Nails show no clubbing.   Psychiatric: She has a normal mood and affect. Her speech is normal and behavior is normal. Cognition and memory are normal.       Assessment:     1. Pre-operative cardiovascular examination      STABLE  CV STATUS- NO ACTIVE MYOCARDIAL ISCHEMIA. NO ARRHYTHMIAS  ECG TODAY- SR ,LAD, OTHERWISE UNREMARKABLE  Plan:     1- LOW RISK FOR PERIOPERATIVE  CV EVENTS    2- NO FURTHER CARD  WORK UP OR SPECIFIC CARD RECOMMENDATIONS    3- RETURN PRN

## 2018-04-04 NOTE — LETTER
April 4, 2018      Kingston Metcalf MD  [address]  [facility] [address]           [facility]  [address] [place], [place] [address] [address]  Phone: [phone]  Fax: [phone]          Patient: Martha Terrell   MR Number: [id number]   YOB: 1921   Date of Visit: 4/4/2018       Dear Dr. Kingston Metcalf:    Thank you for referring Martha Terrell to me for evaluation. Attached you will find relevant portions of my assessment and plan of care.    If you have questions, please do not hesitate to call me. I look forward to following Martha Terrell along with you.    Sincerely,    Bernardo Hdz MD    Enclosure  CC:  No Recipients    If you would like to receive this communication electronically, please contact [email or web address] or [phone] to request more information on [employer] access.    For providers and/or their staff who would like to refer a patient to [facility], please contact us through our one-stop-shop provider referral line, [facility], at [phone].    If you feel you have received this communication in error or would no longer like to receive these types of communications, please e-mail [email or web address]

## 2018-04-05 ENCOUNTER — TELEPHONE (OUTPATIENT)
Dept: HEMATOLOGY/ONCOLOGY | Facility: CLINIC | Age: 83
End: 2018-04-05

## 2018-04-05 NOTE — TELEPHONE ENCOUNTER
----- Message from Bolivar Munguia MD sent at 4/5/2018  6:39 AM CDT -----  Please call her and try to set up an appointment with me and Dr Metcalf for the same day next week  DR munguia

## 2018-04-17 ENCOUNTER — SOCIAL WORK (OUTPATIENT)
Dept: HEMATOLOGY/ONCOLOGY | Facility: CLINIC | Age: 83
End: 2018-04-17

## 2018-04-17 ENCOUNTER — OFFICE VISIT (OUTPATIENT)
Dept: HEMATOLOGY/ONCOLOGY | Facility: CLINIC | Age: 83
End: 2018-04-17
Payer: MEDICARE

## 2018-04-17 VITALS
RESPIRATION RATE: 16 BRPM | DIASTOLIC BLOOD PRESSURE: 62 MMHG | OXYGEN SATURATION: 98 % | WEIGHT: 106.94 LBS | HEIGHT: 60 IN | BODY MASS INDEX: 21 KG/M2 | HEART RATE: 66 BPM | TEMPERATURE: 98 F | SYSTOLIC BLOOD PRESSURE: 128 MMHG

## 2018-04-17 DIAGNOSIS — C50.911 RIGHT BREAST CANCER WITH T3 TUMOR, >5 CM IN GREATEST DIMENSION: Primary | ICD-10-CM

## 2018-04-17 PROCEDURE — 99214 OFFICE O/P EST MOD 30 MIN: CPT | Mod: S$GLB,,, | Performed by: INTERNAL MEDICINE

## 2018-04-17 PROCEDURE — 99999 PR PBB SHADOW E&M-EST. PATIENT-LVL III: CPT | Mod: PBBFAC,,, | Performed by: INTERNAL MEDICINE

## 2018-04-17 PROCEDURE — 99499 UNLISTED E&M SERVICE: CPT | Mod: S$PBB,,, | Performed by: INTERNAL MEDICINE

## 2018-04-17 NOTE — PROGRESS NOTES
Subjective:       Patient ID: Martha Terrell is a 89 y.o. female.    Chief Complaint: Follow-up    HPI This is a 89-year-old  lady who  Comes for follow up of her locally advanced breast cancer,.  She recently   was seen in the General Surgery Department because of a  right breast mass.  She   was unable to tell how long she had it, but it had been at least four months.     Mammogram showed that the mass measured 5.0 x 2.4 x 3.4 cm.     On physical exam, she also had palpable axillary nodes on the right side.  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  The patient is a  who lives alone, has no relatives or friends.     She has had Ct scans that show a larhe breast mass and matted axilallary nodes. She has some non calcified pulmonary nodules which are all sub-centimeter and of ukknown significance    She has seen dr Metcalf, who hs offered surgery, in the understanding she is at high risk for antionette-operative cardiovascular events  She has an appointment with him on the .  She has cancelled her appointments with Radiation oncology     She comes to discus what to do going forward  MEDICATIONS:  Amlodipine, benazepril, and Dyazide.     MEDICATIONS:  See MedCard.     PREVIOUS SURGERIES:  Hysterectomy at age 43, tonsillectomy.     SOCIAL HISTORY:  She is a  who lives in [place].  She lives alone.    She says she has no friends or relatives.     She used to smoke for 25 years, averaging half a pack a day.  She denies   significant drinking.  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     FAMILY HISTORY:  Father  of colon cancer.  Mother had diabetes.  No heart   attacks.     PAST MEDICAL HISTORY:  1.  Locally advanced breast cancer on the right side.  2.  Urinary frequency.  3. Hx of tobacco use     Review of Systems   Constitutional: Negative.     HENT: Negative.    Eyes: Negative.    Respiratory: Negative.  Negative for cough and wheezing.    Cardiovascular: Negative.  Negative for chest pain.   Gastrointestinal: Negative.    Genitourinary: Negative.    Neurological: Negative.    Psychiatric/Behavioral: Negative.        Objective:      Physical Exam   Constitutional: She appears well-developed.   Eyes: Pupils are equal, round, and reactive to light.   Neck: Normal range of motion.   Cardiovascular: Normal rate.    Pulmonary/Chest: Effort normal and breath sounds normal.   Neurological: She is alert.   Skin: Skin is warm.   Psychiatric: She has a normal mood and affect.       Wt Readings from Last 3 Encounters:   04/17/18 48.5 kg (106 lb 14.8 oz)   04/04/18 47.6 kg (105 lb)   04/02/18 48.8 kg (107 lb 9.4 oz)     Temp Readings from Last 3 Encounters:   04/17/18 97.8 °F (36.6 °C) (Oral)   04/02/18 97.9 °F (36.6 °C) (Oral)   03/29/18 97.8 °F (36.6 °C) (Oral)     BP Readings from Last 3 Encounters:   04/17/18 128/62   04/04/18 130/72   04/02/18 110/64     Pulse Readings from Last 3 Encounters:   04/17/18 66   04/04/18 62   04/02/18 62       Assessment:       1. Right breast cancer with T3 tumor, >5 cm in greatest dimension        Plan:       We once again discussed her options. She has a triple negative breast cancer so hormonal manipulation is not an option.  She is   not a candidate for systemic chemotherapy.  She came in [facility] by a neighbor and we updated her in regards to the patient;s situation. We had meet with our  department  She will meet with dr Metcalf and decide if she wants to go ahead with the surgery

## 2018-04-18 NOTE — PROGRESS NOTES
"SW met with pt today in exam room after nurse indicated pt reported being depressed. SW had met pt previously, so rapport was easier established. Pt reported feeling down about her situation, but pt spirits were lifted as she spoke about her dog. Md came into room and SW stayed to provide support. Pt asked the MD "don't I know you from somewhere" as he entered the exam room. MD indicated he was her oncologist and asked if she remembered she had breast cancer. Pt said "how could I forget". MD reminded pt about her appt with Dr. Metcalf (pt remembered) and mentioned her cancelling several times with radiation oncology (pt did not remember doing this). At this point, we suggested involving pt's neighbor who had brought her to appt today. Pt was agreeable to this.     SW stayed with pt, neighbor as Dr. Munguia discussed her options of treatment - surgery, radiation, surgery/radiation combined - with his strong suggestion of surgery as the tumor may grow bigger and become very painful. Pt verbalized her understanding. After visit pt admitted she is not found of surgery, but she does not feel she has another option. SW reminded her again (neighbor present) that we have funds to assist her with transportation should she decide to complete any recommended radiation. SW asked pt if she would complete and sign involvement of care form for her neighbor as neighbor would like to assist with coordination of pt's care. Pt agreed and signed form was sent to medical records and copy placed in her folder. SW will cont to f/u pt and provide necessary support.   "

## 2018-04-23 ENCOUNTER — DOCUMENTATION ONLY (OUTPATIENT)
Dept: INFUSION THERAPY | Facility: HOSPITAL | Age: 83
End: 2018-04-23

## 2018-04-26 ENCOUNTER — TELEPHONE (OUTPATIENT)
Dept: HEMATOLOGY/ONCOLOGY | Facility: CLINIC | Age: 83
End: 2018-04-26

## 2018-04-26 ENCOUNTER — TELEPHONE (OUTPATIENT)
Dept: SURGERY | Facility: CLINIC | Age: 83
End: 2018-04-26

## 2018-04-26 NOTE — TELEPHONE ENCOUNTER
SW attempted to call pt's neighbor/friend (emergency contact) after reading pt appears confused about her appt with surgeon today. Last visit (with friend present) pt agreed to appt today to discuss surgery options and setting appt. Today in phone message, pt seemed to not remember this conversation. BOB called to get insight from friend/neighbor who was said to be bringing her for today' appt. BOB will f/u tomorrow.

## 2018-04-26 NOTE — TELEPHONE ENCOUNTER
Returned patient's call Re: rescheduled appointment today, she stated that she did not know why she needed this appointment she was advised that the appointment was to discuss breast surgical options, she stated that she does not know anything about having breast surgery, she was advised to reschedule the appointment. She then stated that she doesn't know if she wanted to make an appointment and that she will call back when she decides, the patient then hung up.

## 2018-04-27 ENCOUNTER — TELEPHONE (OUTPATIENT)
Dept: SURGERY | Facility: CLINIC | Age: 83
End: 2018-04-27

## 2018-04-27 NOTE — TELEPHONE ENCOUNTER
Spoke to Mrs. Mason, the patient's neighbor/Friend, she was advised of the patient's appointment date and time with Dr. Metcalf which is scheduled for Monday 04/30/2018 at 10:40, she stated that she will be the one bringing the patient to her appointment.

## 2018-04-30 ENCOUNTER — OFFICE VISIT (OUTPATIENT)
Dept: SURGERY | Facility: CLINIC | Age: 83
End: 2018-04-30
Payer: MEDICARE

## 2018-04-30 ENCOUNTER — LAB VISIT (OUTPATIENT)
Dept: LAB | Facility: HOSPITAL | Age: 83
End: 2018-04-30
Attending: SURGERY
Payer: MEDICARE

## 2018-04-30 VITALS
SYSTOLIC BLOOD PRESSURE: 122 MMHG | HEART RATE: 68 BPM | BODY MASS INDEX: 20.67 KG/M2 | WEIGHT: 105.81 LBS | DIASTOLIC BLOOD PRESSURE: 70 MMHG | TEMPERATURE: 98 F

## 2018-04-30 DIAGNOSIS — C50.911 RIGHT BREAST CANCER WITH T3 TUMOR, >5 CM IN GREATEST DIMENSION: Primary | ICD-10-CM

## 2018-04-30 DIAGNOSIS — C77.3 BREAST CANCER METASTASIZED TO AXILLARY LYMPH NODE, RIGHT: ICD-10-CM

## 2018-04-30 DIAGNOSIS — C50.911 BREAST CANCER METASTASIZED TO AXILLARY LYMPH NODE, RIGHT: ICD-10-CM

## 2018-04-30 DIAGNOSIS — C50.911 RIGHT BREAST CANCER WITH T3 TUMOR, >5 CM IN GREATEST DIMENSION: ICD-10-CM

## 2018-04-30 LAB
ALBUMIN SERPL BCP-MCNC: 3.7 G/DL
ALP SERPL-CCNC: 80 U/L
ALT SERPL W/O P-5'-P-CCNC: 16 U/L
ANION GAP SERPL CALC-SCNC: 7 MMOL/L
AST SERPL-CCNC: 21 U/L
BASOPHILS # BLD AUTO: 0.07 K/UL
BASOPHILS NFR BLD: 0.8 %
BILIRUB SERPL-MCNC: 0.4 MG/DL
BUN SERPL-MCNC: 26 MG/DL
CALCIUM SERPL-MCNC: 10.1 MG/DL
CHLORIDE SERPL-SCNC: 102 MMOL/L
CO2 SERPL-SCNC: 30 MMOL/L
CREAT SERPL-MCNC: 0.8 MG/DL
DIFFERENTIAL METHOD: ABNORMAL
EOSINOPHIL # BLD AUTO: 0.2 K/UL
EOSINOPHIL NFR BLD: 2.4 %
ERYTHROCYTE [DISTWIDTH] IN BLOOD BY AUTOMATED COUNT: 14.4 %
EST. GFR  (AFRICAN AMERICAN): >60 ML/MIN/1.73 M^2
EST. GFR  (NON AFRICAN AMERICAN): >60 ML/MIN/1.73 M^2
GLUCOSE SERPL-MCNC: 100 MG/DL
HCT VFR BLD AUTO: 41.9 %
HGB BLD-MCNC: 12.8 G/DL
IMM GRANULOCYTES # BLD AUTO: 0.15 K/UL
IMM GRANULOCYTES NFR BLD AUTO: 1.8 %
LYMPHOCYTES # BLD AUTO: 1.7 K/UL
LYMPHOCYTES NFR BLD: 20.1 %
MCH RBC QN AUTO: 29.2 PG
MCHC RBC AUTO-ENTMCNC: 30.5 G/DL
MCV RBC AUTO: 95 FL
MONOCYTES # BLD AUTO: 0.7 K/UL
MONOCYTES NFR BLD: 8.6 %
NEUTROPHILS # BLD AUTO: 5.7 K/UL
NEUTROPHILS NFR BLD: 66.3 %
NRBC BLD-RTO: 0 /100 WBC
PLATELET # BLD AUTO: 246 K/UL
PMV BLD AUTO: 10.2 FL
POTASSIUM SERPL-SCNC: 5.3 MMOL/L
PROT SERPL-MCNC: 7.6 G/DL
RBC # BLD AUTO: 4.39 M/UL
SODIUM SERPL-SCNC: 139 MMOL/L
WBC # BLD AUTO: 8.51 K/UL

## 2018-04-30 PROCEDURE — 80053 COMPREHEN METABOLIC PANEL: CPT

## 2018-04-30 PROCEDURE — 99999 PR PBB SHADOW E&M-EST. PATIENT-LVL III: CPT | Mod: PBBFAC,,, | Performed by: SURGERY

## 2018-04-30 PROCEDURE — 36415 COLL VENOUS BLD VENIPUNCTURE: CPT

## 2018-04-30 PROCEDURE — 85025 COMPLETE CBC W/AUTO DIFF WBC: CPT

## 2018-04-30 PROCEDURE — 99499 UNLISTED E&M SERVICE: CPT | Mod: S$GLB,,, | Performed by: SURGERY

## 2018-04-30 PROCEDURE — 99214 OFFICE O/P EST MOD 30 MIN: CPT | Mod: S$GLB,,, | Performed by: SURGERY

## 2018-04-30 RX ORDER — ONDANSETRON 4 MG/1
8 TABLET, ORALLY DISINTEGRATING ORAL EVERY 8 HOURS PRN
Status: CANCELLED | OUTPATIENT
Start: 2018-04-30

## 2018-04-30 NOTE — PATIENT INSTRUCTIONS
"Surgery is scheduled for Tuesday, May 15 at 9 in the morning.  The hospital will call you with a time for arrival the day before.    Please stop taking your aspirin on May 8.  Please taking your vitamin E on May 8  Please take all your usual morning medications on the day of surgery.    If any of your preop labs are abnormal we will let you know    [facility]  Instructions for Patients and Families    You are invited to share your experience with me and my staff.  If you receive a survey in the mail, please return it at your convenience, or complete a brief survey on [employer].  We value your opinion!        Did you know that [facility] can be used to make appointments?  Just select "Schedule Appointment" under the "Visits" menu.    Notify you if any of your preop laboratories show abnormalities.  I    Before surgery:  The pre-op nurse from the hospital will call you before the day of your surgery to confirm your arrival time.  The time of your surgery may change due to emergencies or other unforeseen events.  Do not eat or drink anything after midnight the night before your procedure, except for clear liquids up to three hours before your surgery time, and sips of water with medication.  If you are not diabetic, it is recommended that you drink a glass of clear fruit juice (apple, grape, cranberry, not orange) three hours before your surgery time, but nothing after that.  If you are diabetic, you may have water or sugar-free clear liquids such as Crystal Light up to three hours before your surgery time.    Day of Surgery:  · You will go to a pre-op area where an IV will be started and you will speak to the anesthesiologist and surgeon.  · Your family will be updated throughout the operation.  After surgery, your family member may be taken to a private room for consultation with the surgeon.  This is for the privacy of your medical information and does not necessarily mean there is " anything wrong.    If your incisions have:  · Glue:  You may take a bath or shower immediately and wash your skin as you normally do.  The glue will eventually crumble or peel off. Do not let your incisions soak under water.  · Strips: Leave them on, but it is OK if they fall off on their own. It is OK to get them wet 48 hours after surgery.  · Bandage: You may remove it 2 days after your surgery, and then you may leave the incision open and take a shower or bath, unless otherwise instructed. If your bandage has clear plastic, you may shower with it on and then remove it 2 days after surgery.    Activities  · Walking is recommended after surgery; bed rest is not recommended unless specifically ordered.  · If you have had abdominal surgery, do not lift over 20 pounds for 4 weeks after surgery.  · If you have had hernia surgery, do not lift over 20 pounds for 6 weeks after surgery.  · You may drive when you are off your post-operative pain medication.  · Do not smoke after surgery, it decreases your ability to heal and increases the risk of infection and pneumonia.    Diet:  Drink lots of fluids after surgery.  You might not have much of an appetite at first, you may eat regular food when you feel ready, unless you are given special diet instructions.    Post-operative symptoms and medications  · It is safe to take over-the-counter medications for constipation, heartburn, sleep, or itching if needed.  Prescription pain medication may contain acetaminophen (Tylenol), so you should not take additional acetaminophen (Tylenol) at the same time as your pain medication.  · You may experience nausea, low fever/chills, and clear drainage from your incision, sometimes up to a month after surgery.  Notify our office if you have fever over 101 degrees, worsening redness around your incision, thick cloudy drainage, or inability to drink any liquids.  · You will experience some level of pain after surgery.  Your pain medication  "should help with the pain, but may not be able to eliminate it entirely.  Pain will decrease with time, and most pain will be gone by 4 to 6 weeks after surgery.  · We are not able to call in prescriptions for pain medication after hours or on weekends.  If your pain medication is ineffective or you will run out soon and need a refill, please call our office at [phone].  We are not able to replace pain medication that has been lost or stolen.    After surgery, you will either be discharged home or admitted to the hospital.  If you are admitted to the hospital, one of the surgeons or a physician assistant will see you once a day.  Due to scheduled surgery, we may see you in the afternoon or at night; however, your nurse is able to page us at any time.  If you feel there is a situation that is not being addressed properly, please dial [phone] from the phone in your room.    Follow-up appointment  · You will see your surgeon or a physician assistant in clinic for a follow-up appointment at either our [facility] (off [facility]) or [facility] (off [facility]) locations, usually between one and four weeks after your surgery.  · The hospital nurses can make your follow up appointment, or you can make it online at [email or web address] or call [phone].  · If you have a smartphone with the Minutizer ben, please let us know if you would like to do a phone visit instead of a post-op office visit.    If you are signed up for [facility], install the "Minutizer" ben to access your test results, send messages to your doctors, and schedule appointments from your smartphone!      "

## 2018-04-30 NOTE — PROGRESS NOTES
Patient ID: Martha Terrell is a 89 y.o. female.    Right breast cancer that is metastatic to the axillary nodes    Chief Complaint: Follow-up      HPI:  she was found to have a large breast cancer that has spread to her axillary lymph nodes.  She is estrogen and progesterone receptor negative.  She was seen by oncology and she is not a candidate for chemotherapy.    She was seen by cardiology and she has a low risk of cardiac events despite her age of 89.    She complains today of some memory problems and a rash of her chest    She states that she understands that she has to have surgery to prevent the tumor from growing through the skin        Review of Systems   Constitutional: Negative.    HENT: Negative.    Eyes: Negative.    Respiratory: Negative.    Cardiovascular: Negative.    Gastrointestinal: Negative.    Endocrine: Negative.    Genitourinary: Negative.    Musculoskeletal: Negative.    Skin:        Rash of the chest, large mass in the right breast, right breast feels heavier       Current Outpatient Prescriptions   Medication Sig Dispense Refill    ascorbic acid (VITAMIN C) 500 MG tablet Take 500 mg by mouth once daily.      aspirin (ECOTRIN) 81 MG EC tablet Take 81 mg by mouth as needed for Pain.      brimonidine-timolol (COMBIGAN) 0.2-0.5 % Drop Place 1 drop into both eyes 2 (two) times daily. PLEASE DISPENSE A 90 DAY SUPPLY 5 mL 4    calcium-vitamin D tablet 600 mg-200 units Take 1 tablet by mouth once daily.      latanoprost 0.005 % ophthalmic solution Place 1 drop into both eyes every evening. PLEASE DISPENSE A 3 MONTH SUPPLY 3 Bottle 4    lutein 6 mg Cap Take 1 capsule by mouth once daily.       sertraline (ZOLOFT) 50 MG tablet TAKE ONE-HALF TO ONE TABLET BY MOUTH IN THE EVENING 90 tablet 3    tolterodine (DETROL LA) 4 MG 24 hr capsule TAKE ONE CAPSULE BY MOUTH ONCE DAILY 30 capsule 9    VITAMIN B COMP W-C/ZINC (B COMPLEX-C-E-ZN) CpSR Take 1 capsule by mouth.      vitamin E 400 UNIT capsule  Take 400 Units by mouth once daily.       No current facility-administered medications for this visit.        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 OTTO    SLT OS  4/25/13    TONSILLECTOMY      at age 6       Family History   Problem Relation Age of Onset    Cancer Father     Amblyopia Neg Hx     Blindness Neg Hx     Cataracts Neg Hx     Diabetes Neg Hx     Glaucoma Neg Hx     Hypertension Neg Hx     Macular degeneration Neg Hx     Retinal detachment Neg Hx     Strabismus Neg Hx     Stroke Neg Hx     Thyroid disease Neg Hx        Social History     Social History    Marital status:      Spouse name: N/A    Number of children: N/A    Years of education: N/A     Occupational History    Not on file.     Social History Main Topics    Smoking status: Former Smoker     Packs/day: 0.25     Years: 30.00     Quit date: 1/27/1966    Smokeless tobacco: Never Used    Alcohol use No    Drug use: No    Sexual activity: No     Other Topics Concern    Not on file     Social History Narrative    Lives alone, 1 dog.       Vitals:    04/30/18 1037   BP: 122/70   Pulse: 68   Temp: 97.6 °F (36.4 °C)       Physical Exam   Constitutional: She is oriented to person,  place, and time. No distress.   Frail, very thin   HENT:   Head: Normocephalic and atraumatic.   Neck: Normal range of motion. Neck supple.   Cardiovascular: Normal rate and normal heart sounds.    Abdominal: Soft. Bowel sounds are normal.   Musculoskeletal: She exhibits no edema.   Lymphadenopathy:     Cervical adenopathy: right supraclavicular.   Neurological: She is alert and oriented to person, place, and time.   Skin: Skin is warm and dry. No rash noted.   The right breast shows a large, 6 cm mass in the upper outer quadrant.  There palpable axillary lymph nodes.  There are no skin changes   Vitals reviewed.    Apology was consistent with breast cancer with negative estrogen and progesterone receptors.  The lymph nodes in the axilla were positive for cancer.    Imaging studies, oncology notes and cardiology notes were reviewed    Assessment & Plan:    right breast cancer with positive axillary nodes  Right supraclavicular and lymph node, likely representing cancer.    Right modified radical mastectomy  Right supraclavicular lymph node biopsy.    The need for surgery, risks benefits and complications were discussed.    Risks include infection, bleeding, difficulty moving the shoulder, numbness of the arm, blistering of the skin, and a seroma or fluid collection.    Postoperatively the patient will need to be observed for 24 hours.  His management will be consult did for postop discharge planning with skilled nursing and/or home health evaluations.    The patient lives alone and only has a friend to provide assistance

## 2018-05-01 ENCOUNTER — TELEPHONE (OUTPATIENT)
Dept: SURGERY | Facility: CLINIC | Age: 83
End: 2018-05-01

## 2018-05-01 NOTE — TELEPHONE ENCOUNTER
----- Message from Last Robbins sent at 5/1/2018  2:36 PM CDT -----  Contact: pt-friend----Sue Rogers need to know if vitamins are considered herbal pt is having surgery next week callback number is [phone]

## 2018-05-01 NOTE — TELEPHONE ENCOUNTER
Spoke to  Marlon, she was advised per Dr. Metcalf that it is ok for the patient to continue to take her multivitamins, she voiced understanding.

## 2018-05-11 ENCOUNTER — DOCUMENTATION ONLY (OUTPATIENT)
Dept: HEMATOLOGY/ONCOLOGY | Facility: CLINIC | Age: 83
End: 2018-05-11

## 2018-05-11 NOTE — PROGRESS NOTES
SANDRA received a call from pt's emergency contact and neighbor, Ms. Mason, who is providing transportation for pt's surgery next week. She had several questions about details pertaining to the surgery (what time they had to arrive, where and how long pt would have to stay).  She also wondered about SNF vs HH and if either would be ordered due to pt's age.     Last, she asked about POA and Living Will. I noticed there is none on file for this pt. She is 89 and without any living relatives. SANDRA was unsure if this will be discussed with her during pre-admission or not. Snadra messaged surgeon's staff for advice on how to handle this issue and if SW needs to contact pt regarding AD.     SANDRA will cont to f/u as needed.

## 2018-05-14 ENCOUNTER — ANESTHESIA EVENT (OUTPATIENT)
Dept: SURGERY | Facility: HOSPITAL | Age: 83
DRG: 580 | End: 2018-05-14
Payer: MEDICARE

## 2018-05-14 ENCOUNTER — NURSE TRIAGE (OUTPATIENT)
Dept: ADMINISTRATIVE | Facility: CLINIC | Age: 83
End: 2018-05-14

## 2018-05-14 NOTE — PRE-PROCEDURE INSTRUCTIONS
Pre op instructions reviewed with patient per phone:    To confirm, Your surgeon has instructed you:  Surgery is scheduled 5/15/18 at 0830.      Please report to [facility] 1st floor main lobby by 0700  Pre admit office will call this afternoon only if arrival time for surgery changes.      INSTRUCTIONS IMPORTANT!!!  ¨ No smoking after 12 midnight, the night before surgery.  ¨ No solid food after 12 midnight, but you may have clear liquids up until 3 hours prior to surgery.  This includes: grape, cranberry, and apple juice (not orange, and no coffee.)   ¨ OK to brush teeth, but no gum, candy or mints!    ¨ Take only these medicines with a small swallow of water-morning of surgery.  None  ____  Do not wear makeup, including mascara.  ____  No powder, lotions or creams to surgical area.  ____  Please remove all jewelry, including piercings and leave at home.  ____  No money or valuables needed. Please leave at home.  ____  Please bring identification and insurance information to hospital.  ____  If going home the same day, arrange for a ride home. You will not be able to   drive if Anesthesia was used.  ____  Children, under 12 years old, must remain in the waiting room with an adult.  They are not allowed in patient areas.  ____  Wear loose fitting clothing. Allow for dressings, bandages.  ____  Stop Aspirin, Ibuprofen, Motrin and Aleve at least 5-7 days before surgery, unless otherwise instructed by your doctor, or the nurse.   You MAY use Tylenol/acetaminophen until day of surgery.  ____  If you take diabetic medication, do not take am of surgery unless instructed by   Doctor.  ____ Stop taking any Fish Oil supplement or any Vitamins that contain Vitamin E at least 5 days prior to surgery.          Bathing Instructions-- The night before surgery and the morning prior to coming to the hospital:   -Do not shave the surgical area.   -Shower and wash your hair and body as usual with your  regular soap and shampoo.   -Rinse your hair and body completely.   -Use one packet of hibiclens to wash the surgical site (using your hand) gently for 5 minutes.  Do not scrub you skin too hard.   -Do not use hibiclens on your head, face, or genitals.   -Do not wash with regular soap after you use the hibiclens.   -Rinse your body thoroughly.   -Dry with clean, soft towel.  Do not use lotion, cream, deodorant, or powders on   the surgical site.    Use antibacterial soap in place of hibiclens if your surgery is on the head, face or genitals.         Surgical Site Infection    Prevention of surgical site infections:     -Keep incisions clean and dry.   -Do not soak/submerge incisions in water until completely healed.   -Do not apply lotions, powders, creams, or deodorants to site.   -Always make sure hands are cleaned with antibacterial soap/ alcohol-based   prior to touching the surgical site.  (This includes doctors, nurses, staff, and yourself.)    Signs and symptoms:   -Redness and pain around the area where you had surgery   -Drainage of cloudy fluid from your surgical wound   -Fever over 100.4  I have read or had read and explained to me, and understand the above information.

## 2018-05-15 ENCOUNTER — SURGERY (OUTPATIENT)
Age: 83
End: 2018-05-15

## 2018-05-15 ENCOUNTER — ANESTHESIA (OUTPATIENT)
Dept: SURGERY | Facility: HOSPITAL | Age: 83
DRG: 580 | End: 2018-05-15
Payer: MEDICARE

## 2018-05-15 ENCOUNTER — HOSPITAL ENCOUNTER (INPATIENT)
Facility: HOSPITAL | Age: 83
LOS: 2 days | Discharge: NURSING, REHAB OR LONG-TERM CARE | DRG: 580 | End: 2018-05-18
Attending: SURGERY | Admitting: SURGERY
Payer: MEDICARE

## 2018-05-15 DIAGNOSIS — C50.911 BREAST CANCER METASTASIZED TO AXILLARY LYMPH NODE, RIGHT: ICD-10-CM

## 2018-05-15 DIAGNOSIS — C77.3 BREAST CANCER METASTASIZED TO AXILLARY LYMPH NODE, RIGHT: ICD-10-CM

## 2018-05-15 DIAGNOSIS — C50.911 RIGHT BREAST CANCER WITH T3 TUMOR, >5 CM IN GREATEST DIMENSION: ICD-10-CM

## 2018-05-15 LAB — POTASSIUM SERPL-SCNC: 4.1 MMOL/L

## 2018-05-15 PROCEDURE — 25000003 PHARM REV CODE 250: Performed by: NURSE ANESTHETIST, CERTIFIED REGISTERED

## 2018-05-15 PROCEDURE — 97116 GAIT TRAINING THERAPY: CPT

## 2018-05-15 PROCEDURE — G8979 MOBILITY GOAL STATUS: HCPCS | Mod: CI

## 2018-05-15 PROCEDURE — 36000706: Performed by: SURGERY

## 2018-05-15 PROCEDURE — 88305 TISSUE EXAM BY PATHOLOGIST: CPT | Mod: 26,,, | Performed by: PATHOLOGY

## 2018-05-15 PROCEDURE — 37000009 HC ANESTHESIA EA ADD 15 MINS: Performed by: SURGERY

## 2018-05-15 PROCEDURE — G8987 SELF CARE CURRENT STATUS: HCPCS | Mod: CL

## 2018-05-15 PROCEDURE — G8978 MOBILITY CURRENT STATUS: HCPCS | Mod: CK

## 2018-05-15 PROCEDURE — 0HTT0ZZ RESECTION OF RIGHT BREAST, OPEN APPROACH: ICD-10-PCS | Performed by: SURGERY

## 2018-05-15 PROCEDURE — 88305 TISSUE EXAM BY PATHOLOGIST: CPT | Performed by: PATHOLOGY

## 2018-05-15 PROCEDURE — 27201423 OPTIME MED/SURG SUP & DEVICES STERILE SUPPLY: Performed by: SURGERY

## 2018-05-15 PROCEDURE — 25000003 PHARM REV CODE 250: Performed by: SURGERY

## 2018-05-15 PROCEDURE — 84132 ASSAY OF SERUM POTASSIUM: CPT

## 2018-05-15 PROCEDURE — 88307 TISSUE EXAM BY PATHOLOGIST: CPT | Mod: 26,,, | Performed by: PATHOLOGY

## 2018-05-15 PROCEDURE — G8988 SELF CARE GOAL STATUS: HCPCS | Mod: CJ

## 2018-05-15 PROCEDURE — 25000003 PHARM REV CODE 250: Performed by: ANESTHESIOLOGY

## 2018-05-15 PROCEDURE — 07B10ZX EXCISION OF RIGHT NECK LYMPHATIC, OPEN APPROACH, DIAGNOSTIC: ICD-10-PCS | Performed by: SURGERY

## 2018-05-15 PROCEDURE — 19307 MAST MOD RAD: CPT | Mod: 80,RT,, | Performed by: SURGERY

## 2018-05-15 PROCEDURE — 37000008 HC ANESTHESIA 1ST 15 MINUTES: Performed by: SURGERY

## 2018-05-15 PROCEDURE — C1729 CATH, DRAINAGE: HCPCS | Performed by: SURGERY

## 2018-05-15 PROCEDURE — 63600175 PHARM REV CODE 636 W HCPCS: Performed by: NURSE ANESTHETIST, CERTIFIED REGISTERED

## 2018-05-15 PROCEDURE — 71000033 HC RECOVERY, INTIAL HOUR: Performed by: SURGERY

## 2018-05-15 PROCEDURE — 07T50ZZ RESECTION OF RIGHT AXILLARY LYMPHATIC, OPEN APPROACH: ICD-10-PCS | Performed by: SURGERY

## 2018-05-15 PROCEDURE — 36000707: Performed by: SURGERY

## 2018-05-15 PROCEDURE — 97166 OT EVAL MOD COMPLEX 45 MIN: CPT

## 2018-05-15 PROCEDURE — 63600175 PHARM REV CODE 636 W HCPCS: Performed by: SURGERY

## 2018-05-15 PROCEDURE — 19307 MAST MOD RAD: CPT | Mod: RT,,, | Performed by: SURGERY

## 2018-05-15 PROCEDURE — 97161 PT EVAL LOW COMPLEX 20 MIN: CPT

## 2018-05-15 PROCEDURE — 97530 THERAPEUTIC ACTIVITIES: CPT

## 2018-05-15 RX ORDER — CHLORHEXIDINE GLUCONATE ORAL RINSE 1.2 MG/ML
10 SOLUTION DENTAL 2 TIMES DAILY
Status: DISCONTINUED | OUTPATIENT
Start: 2018-05-15 | End: 2018-05-18 | Stop reason: HOSPADM

## 2018-05-15 RX ORDER — ONDANSETRON 8 MG/1
8 TABLET, ORALLY DISINTEGRATING ORAL EVERY 8 HOURS PRN
Status: DISCONTINUED | OUTPATIENT
Start: 2018-05-15 | End: 2018-05-18 | Stop reason: HOSPADM

## 2018-05-15 RX ORDER — OXYCODONE AND ACETAMINOPHEN 5; 325 MG/1; MG/1
1 TABLET ORAL
Status: DISCONTINUED | OUTPATIENT
Start: 2018-05-15 | End: 2018-05-15 | Stop reason: HOSPADM

## 2018-05-15 RX ORDER — BRIMONIDINE TARTRATE AND TIMOLOL MALEATE 2; 5 MG/ML; MG/ML
1 SOLUTION OPHTHALMIC 2 TIMES DAILY
Status: DISCONTINUED | OUTPATIENT
Start: 2018-05-15 | End: 2018-05-15 | Stop reason: RX

## 2018-05-15 RX ORDER — NEOSTIGMINE METHYLSULFATE 1 MG/ML
INJECTION, SOLUTION INTRAVENOUS
Status: DISCONTINUED | OUTPATIENT
Start: 2018-05-15 | End: 2018-05-15

## 2018-05-15 RX ORDER — HYDROCODONE BITARTRATE AND ACETAMINOPHEN 5; 325 MG/1; MG/1
1 TABLET ORAL EVERY 6 HOURS PRN
Status: DISCONTINUED | OUTPATIENT
Start: 2018-05-15 | End: 2018-05-18 | Stop reason: HOSPADM

## 2018-05-15 RX ORDER — GLYCOPYRROLATE 0.2 MG/ML
INJECTION INTRAMUSCULAR; INTRAVENOUS
Status: DISCONTINUED | OUTPATIENT
Start: 2018-05-15 | End: 2018-05-15

## 2018-05-15 RX ORDER — HYDROCODONE/ACETAMINOPHEN 5 MG-500MG
1 TABLET ORAL DAILY
Status: DISCONTINUED | OUTPATIENT
Start: 2018-05-15 | End: 2018-05-15 | Stop reason: RX

## 2018-05-15 RX ORDER — TIMOLOL MALEATE 5 MG/ML
1 SOLUTION/ DROPS OPHTHALMIC 2 TIMES DAILY
Status: DISCONTINUED | OUTPATIENT
Start: 2018-05-15 | End: 2018-05-18 | Stop reason: HOSPADM

## 2018-05-15 RX ORDER — LIDOCAINE HCL/PF 100 MG/5ML
SYRINGE (ML) INTRAVENOUS
Status: DISCONTINUED | OUTPATIENT
Start: 2018-05-15 | End: 2018-05-15

## 2018-05-15 RX ORDER — SUCCINYLCHOLINE CHLORIDE 20 MG/ML
INJECTION INTRAMUSCULAR; INTRAVENOUS
Status: DISCONTINUED | OUTPATIENT
Start: 2018-05-15 | End: 2018-05-15

## 2018-05-15 RX ORDER — ROCURONIUM BROMIDE 10 MG/ML
INJECTION, SOLUTION INTRAVENOUS
Status: DISCONTINUED | OUTPATIENT
Start: 2018-05-15 | End: 2018-05-15

## 2018-05-15 RX ORDER — FENTANYL CITRATE 50 UG/ML
INJECTION, SOLUTION INTRAMUSCULAR; INTRAVENOUS
Status: DISCONTINUED | OUTPATIENT
Start: 2018-05-15 | End: 2018-05-15

## 2018-05-15 RX ORDER — ACETAMINOPHEN 10 MG/ML
INJECTION, SOLUTION INTRAVENOUS
Status: DISCONTINUED | OUTPATIENT
Start: 2018-05-15 | End: 2018-05-15

## 2018-05-15 RX ORDER — PROPOFOL 10 MG/ML
VIAL (ML) INTRAVENOUS
Status: DISCONTINUED | OUTPATIENT
Start: 2018-05-15 | End: 2018-05-15

## 2018-05-15 RX ORDER — SERTRALINE HYDROCHLORIDE 50 MG/1
50 TABLET, FILM COATED ORAL NIGHTLY
Status: DISCONTINUED | OUTPATIENT
Start: 2018-05-15 | End: 2018-05-18 | Stop reason: HOSPADM

## 2018-05-15 RX ORDER — HYDROMORPHONE HYDROCHLORIDE 2 MG/ML
0.5 INJECTION, SOLUTION INTRAMUSCULAR; INTRAVENOUS; SUBCUTANEOUS EVERY 6 HOURS PRN
Status: DISCONTINUED | OUTPATIENT
Start: 2018-05-15 | End: 2018-05-18 | Stop reason: HOSPADM

## 2018-05-15 RX ORDER — SODIUM CHLORIDE 0.9 % (FLUSH) 0.9 %
3 SYRINGE (ML) INJECTION
Status: DISCONTINUED | OUTPATIENT
Start: 2018-05-15 | End: 2018-05-15 | Stop reason: HOSPADM

## 2018-05-15 RX ORDER — DEXAMETHASONE SODIUM PHOSPHATE 4 MG/ML
INJECTION, SOLUTION INTRA-ARTICULAR; INTRALESIONAL; INTRAMUSCULAR; INTRAVENOUS; SOFT TISSUE
Status: DISCONTINUED | OUTPATIENT
Start: 2018-05-15 | End: 2018-05-15

## 2018-05-15 RX ORDER — MEPERIDINE HYDROCHLORIDE 50 MG/ML
12.5 INJECTION INTRAMUSCULAR; INTRAVENOUS; SUBCUTANEOUS ONCE AS NEEDED
Status: DISCONTINUED | OUTPATIENT
Start: 2018-05-15 | End: 2018-05-15 | Stop reason: HOSPADM

## 2018-05-15 RX ORDER — SODIUM CHLORIDE, SODIUM LACTATE, POTASSIUM CHLORIDE, CALCIUM CHLORIDE 600; 310; 30; 20 MG/100ML; MG/100ML; MG/100ML; MG/100ML
INJECTION, SOLUTION INTRAVENOUS CONTINUOUS
Status: DISCONTINUED | OUTPATIENT
Start: 2018-05-15 | End: 2018-05-17

## 2018-05-15 RX ORDER — BRIMONIDINE TARTRATE 2 MG/ML
1 SOLUTION/ DROPS OPHTHALMIC 2 TIMES DAILY
Status: DISCONTINUED | OUTPATIENT
Start: 2018-05-15 | End: 2018-05-18 | Stop reason: HOSPADM

## 2018-05-15 RX ORDER — SODIUM CHLORIDE, SODIUM LACTATE, POTASSIUM CHLORIDE, CALCIUM CHLORIDE 600; 310; 30; 20 MG/100ML; MG/100ML; MG/100ML; MG/100ML
INJECTION, SOLUTION INTRAVENOUS CONTINUOUS
Status: DISCONTINUED | OUTPATIENT
Start: 2018-05-15 | End: 2018-05-15

## 2018-05-15 RX ORDER — ONDANSETRON 2 MG/ML
INJECTION INTRAMUSCULAR; INTRAVENOUS
Status: DISCONTINUED | OUTPATIENT
Start: 2018-05-15 | End: 2018-05-15

## 2018-05-15 RX ORDER — PHENYLEPHRINE HYDROCHLORIDE 10 MG/ML
INJECTION INTRAVENOUS
Status: DISCONTINUED | OUTPATIENT
Start: 2018-05-15 | End: 2018-05-15

## 2018-05-15 RX ORDER — MORPHINE SULFATE 4 MG/ML
2 INJECTION, SOLUTION INTRAMUSCULAR; INTRAVENOUS EVERY 5 MIN PRN
Status: DISCONTINUED | OUTPATIENT
Start: 2018-05-15 | End: 2018-05-15 | Stop reason: HOSPADM

## 2018-05-15 RX ORDER — LATANOPROST 50 UG/ML
1 SOLUTION/ DROPS OPHTHALMIC NIGHTLY
Status: DISCONTINUED | OUTPATIENT
Start: 2018-05-15 | End: 2018-05-18 | Stop reason: HOSPADM

## 2018-05-15 RX ORDER — ONDANSETRON 8 MG/1
8 TABLET, ORALLY DISINTEGRATING ORAL EVERY 8 HOURS PRN
Status: DISCONTINUED | OUTPATIENT
Start: 2018-05-15 | End: 2018-05-15 | Stop reason: HOSPADM

## 2018-05-15 RX ORDER — CEFAZOLIN SODIUM 1 G/50ML
2 SOLUTION INTRAVENOUS
Status: COMPLETED | OUTPATIENT
Start: 2018-05-15 | End: 2018-05-15

## 2018-05-15 RX ORDER — OXYBUTYNIN CHLORIDE 5 MG/1
10 TABLET, EXTENDED RELEASE ORAL DAILY
Status: DISCONTINUED | OUTPATIENT
Start: 2018-05-15 | End: 2018-05-18 | Stop reason: HOSPADM

## 2018-05-15 RX ORDER — ONDANSETRON 2 MG/ML
4 INJECTION INTRAMUSCULAR; INTRAVENOUS ONCE AS NEEDED
Status: DISCONTINUED | OUTPATIENT
Start: 2018-05-15 | End: 2018-05-15 | Stop reason: HOSPADM

## 2018-05-15 RX ADMIN — LATANOPROST 1 DROP: 50 SOLUTION OPHTHALMIC at 09:05

## 2018-05-15 RX ADMIN — LIDOCAINE HYDROCHLORIDE 100 MG: 20 INJECTION, SOLUTION INTRAVENOUS at 08:05

## 2018-05-15 RX ADMIN — ONDANSETRON 4 MG: 2 INJECTION, SOLUTION INTRAMUSCULAR; INTRAVENOUS at 10:05

## 2018-05-15 RX ADMIN — ROBINUL 0.4 MG: 0.2 INJECTION INTRAMUSCULAR; INTRAVENOUS at 09:05

## 2018-05-15 RX ADMIN — PHENYLEPHRINE HYDROCHLORIDE 200 MCG: 10 INJECTION INTRAVENOUS at 09:05

## 2018-05-15 RX ADMIN — FENTANYL CITRATE 50 MCG: 50 INJECTION, SOLUTION INTRAMUSCULAR; INTRAVENOUS at 08:05

## 2018-05-15 RX ADMIN — PROPOFOL 100 MG: 10 INJECTION, EMULSION INTRAVENOUS at 08:05

## 2018-05-15 RX ADMIN — BRIMONIDINE TARTRATE 1 DROP: 2 SOLUTION OPHTHALMIC at 01:05

## 2018-05-15 RX ADMIN — TIMOLOL MALEATE 1 DROP: 5 SOLUTION OPHTHALMIC at 09:05

## 2018-05-15 RX ADMIN — CHLORHEXIDINE GLUCONATE 10 ML: 1.2 RINSE ORAL at 09:05

## 2018-05-15 RX ADMIN — SUCCINYLCHOLINE CHLORIDE 140 MG: 20 INJECTION, SOLUTION INTRAMUSCULAR; INTRAVENOUS at 08:05

## 2018-05-15 RX ADMIN — ROCURONIUM BROMIDE 5 MG: 10 INJECTION, SOLUTION INTRAVENOUS at 08:05

## 2018-05-15 RX ADMIN — SODIUM CHLORIDE, SODIUM LACTATE, POTASSIUM CHLORIDE, AND CALCIUM CHLORIDE: .6; .31; .03; .02 INJECTION, SOLUTION INTRAVENOUS at 12:05

## 2018-05-15 RX ADMIN — BRIMONIDINE TARTRATE 1 DROP: 2 SOLUTION OPHTHALMIC at 09:05

## 2018-05-15 RX ADMIN — DEXAMETHASONE SODIUM PHOSPHATE 4 MG: 4 INJECTION, SOLUTION INTRA-ARTICULAR; INTRALESIONAL; INTRAMUSCULAR; INTRAVENOUS; SOFT TISSUE at 10:05

## 2018-05-15 RX ADMIN — ROBINUL 0.8 MG: 0.2 INJECTION INTRAMUSCULAR; INTRAVENOUS at 10:05

## 2018-05-15 RX ADMIN — TIMOLOL MALEATE 1 DROP: 5 SOLUTION OPHTHALMIC at 01:05

## 2018-05-15 RX ADMIN — SODIUM CHLORIDE, SODIUM LACTATE, POTASSIUM CHLORIDE, AND CALCIUM CHLORIDE: 600; 310; 30; 20 INJECTION, SOLUTION INTRAVENOUS at 08:05

## 2018-05-15 RX ADMIN — ACETAMINOPHEN 1000 MG: 10 INJECTION, SOLUTION INTRAVENOUS at 10:05

## 2018-05-15 RX ADMIN — OXYBUTYNIN CHLORIDE 10 MG: 5 TABLET, EXTENDED RELEASE ORAL at 01:05

## 2018-05-15 RX ADMIN — ROCURONIUM BROMIDE 35 MG: 10 INJECTION, SOLUTION INTRAVENOUS at 09:05

## 2018-05-15 RX ADMIN — CEFAZOLIN SODIUM 2 G: 1 SOLUTION INTRAVENOUS at 09:05

## 2018-05-15 RX ADMIN — CHLORHEXIDINE GLUCONATE 10 ML: 1.2 RINSE ORAL at 01:05

## 2018-05-15 RX ADMIN — SERTRALINE HYDROCHLORIDE 50 MG: 50 TABLET ORAL at 09:05

## 2018-05-15 RX ADMIN — NEOSTIGMINE METHYLSULFATE 5 MG: 1 INJECTION INTRAVENOUS at 10:05

## 2018-05-15 NOTE — PT/OT/SLP EVAL
Physical Therapy Evaluation    Patient Name:  Martha Terrell   MRN:  [id number]    Recommendations:     Discharge Recommendations:  rehabilitation facility   Discharge Equipment Recommendations:     Barriers to discharge: Decreased caregiver support    Assessment:     Martha Terrell is a 89 y.o. female admitted with a medical diagnosis of <principal problem not specified>.  She presents with the following impairments/functional limitations:  weakness, impaired endurance, impaired functional mobilty, gait instability, decreased upper extremity function, impaired balance, impaired self care skills, pain, decreased ROM .    Rehab Prognosis:  EXCELLENT; patient would benefit from acute skilled PT services to address these deficits and reach maximum level of function.      Recent Surgery: Procedure(s) (LRB):  MASTECTOMY-MODIFIED RADICAL (Right)  BIOPSY-LYMPH NODE (Right) Day of Surgery    Plan:     During this hospitalization, patient to be seen   to address the above listed problems via gait training, therapeutic activities, therapeutic exercises  · Plan of Care Expires:  05/22/18   Plan of Care Reviewed with: patient    Subjective     Communicated with NURSE AND EPIC CHART REVIEW prior to session.  Patient found SUP IN BED  upon PT entry to room, agreeable to evaluation.      Chief Complaint: PAIN R AXILLA  Patient comments/goals: INC STRENGTH  Pain/Comfort:  · Pain Rating 1: 3/10  · Location - Side 1: Right  · Location 1: axilla  · Pain Rating Post-Intervention 1: 3/10    Patients cultural, spiritual, Church conflicts given the current situation:      Living Environment:   PT LIVES AT HOME IN A APT WITH 18 STEPS TO ENTER HOME WITH 1 RAILING.   Prior to admission, patients level of function was IND AND DRIVES.  Patient has the following equipment: none.  DME owned (not currently used): none.  Upon discharge, patient will have assistance from NONE.    Objective:     Patient found with: peripheral IV     General  Precautions: Standard, fall   Orthopedic Precautions:    Braces: N/A     Exams:  · RLE ROM: LIMITED  · RLE Strength: LIMITED  · LLE ROM: WNL  · LLE Strength: WNL    Functional Mobility:  · PT SUP>SIT EOB WITH MIN A. PT STOOD WITH HHA AND GT TRAINED X 20' WITH HHA (MIN A). PT RETURNED TO RM SEATED EOB AND SUP IN BED WITH MIN A. PT EDUCATED ON ROLE OF P.T. AND REC FOR INPT REHAB AT D/C    AM-PAC 6 CLICK MOBILITY  Total Score:16    Patient left supine with call button in reach.    GOALS:    Physical Therapy Goals        Problem: Physical Therapy Goal    Goal Priority Disciplines Outcome Goal Variances Interventions   Physical Therapy Goal     PT/OT, PT      Description:  PT WILL BE SEEN FOR P.T. FOR A MIN OF 5 OUT OF 7 DAYS A WEEK  LT18  1. PT WILL COMPLETE BED MOBILITY IND  2. PT WILL T/F TO CHAIR WITH S.  3. PT WILL GT TRAIN X 150' WITH OR WITHOUT RW WITH SBA.  4. PT WILL COMPLETE B LE TE X 20 REPS                    History:     Past Medical History:   Diagnosis Date    Anxiety     Cataract     ERMS (embryonal rhabdomyosarcoma)     OS    ERMS (embryonal rhabdomyosarcoma) 2016    OS     Glaucoma     Hiatal hernia 13    EGD    Insomnia     falling asleep    OP (osteoporosis)     Right breast cancer with T3 tumor, >5 cm in greatest dimension 3/27/2018       Past Surgical History:   Procedure Laterality Date    CATARACT EXTRACTION W/  INTRAOCULAR LENS IMPLANT Right 03/10/2017    iStent     HYSTERECTOMY      at age 41    PCIOL Right 03/10/2017    DR. OTTO    SLT OS  13    TONSILLECTOMY      at age 6       Clinical Decision Making:     History  Co-morbidities and personal factors that may impact the plan of care Examination  Body Structures and Functions, activity limitations and participation restrictions that may impact the plan of care Clinical Presentation   Decision Making/ Complexity Score   Co-morbidities:   [] Time since onset of injury / illness / exacerbation  [] Status of  current condition  []Patient's cognitive status and safety concerns    [] Multiple Medical Problems (see med hx)  Personal Factors:   [] Patient's age  [] Prior Level of function   [] Patient's home situation (environment and family support)  [] Patient's level of motivation  [] Expected progression of patient      HISTORY:(criteria)    [] 80614 - no personal factors/history    [] 45356 - has 1-2 personal factor/comorbidity     [] 81775 - has >3 personal factor/comorbidity     Body Regions:  [] Objective examination findings  [] Head     []  Neck  [] Trunk   [] Upper Extremity  [] Lower Extremity    Body Systems:  [] For communication ability, affect, cognition, language, and learning style: the assessment of the ability to make needs known, consciousness, orientation (person, place, and time), expected emotional /behavioral responses, and learning preferences (eg, learning barriers, education  needs)  [] For the neuromuscular system: a general assessment of gross coordinated movement (eg, balance, gait, locomotion, transfers, and transitions) and motor function  (motor control and motor learning)  [] For the musculoskeletal system: the assessment of gross symmetry, gross range of motion, gross strength, height, and weight  [] For the integumentary system: the assessment of pliability(texture), presence of scar formation, skin color, and skin integrity  [] For cardiovascular/pulmonary system: the assessment of heart rate, respiratory rate, blood pressure, and edema     Activity limitations:    [] Patient's cognitive status and saf ety concerns          [] Status of current condition      [] Weight bearing restriction  [] Cardiopulmunary Restriction    Participation Restrictions:   [] Goals and goal agreement with the patient     [] Rehab potential (prognosis) and probable outcome      Examination of Body System: (criteria)    [] 30901 - addressing 1-2 elements    [] 22079 - addressing a total of 3 or more elements      [] 34119 -  Addressing a total of 4 or more elements         Clinical Presentation: (criteria)  Choose one     On examination of body system using standardized tests and measures patient presents with (CHOOSE ONE) elements from any of the following: body structures and functions, activity limitations, and/or participation restrictions.  Leading to a clinical presentation that is considered (CHOOSE ONE)                              Clinical Decision Making  (Eval Complexity):  Choose One     Time Tracking:     PT Received On: 05/15/18  PT Start Time: 1325     PT Stop Time: 1355  PT Total Time (min): 30 min     Billable Minutes: Evaluation 15 and Gait Training 15      Alicia Harris, PT  05/15/2018

## 2018-05-15 NOTE — PLAN OF CARE
Pt resting on stretcher s/p rt modified radical mastectomy & Rt subclavian lymph node biopsy performed under general anesthesia by Dr. Metcalf. Respirations even and unlabored on 5L O2 via NC with O2 sats of 97%. VSS. See flow sheet for detailed assessment. Will cont to monitor.

## 2018-05-15 NOTE — ANESTHESIA POSTPROCEDURE EVALUATION
Anesthesia Post Evaluation    Patient: Martha Terrell    Procedure(s) Performed: Procedure(s) (LRB):  MASTECTOMY-MODIFIED RADICAL (Right)  BIOPSY-LYMPH NODE (Right)    Final Anesthesia Type: general  Patient location during evaluation: PACU  Patient participation: Yes- Able to Participate  Level of consciousness: awake and alert  Post-procedure vital signs: reviewed and stable  Pain management: adequate  Airway patency: patent  PONV status at discharge: No PONV  Anesthetic complications: no      Cardiovascular status: blood pressure returned to baseline and hemodynamically stable  Respiratory status: spontaneous ventilation, unassisted and room air  Hydration status: euvolemic  Follow-up not needed.        Visit Vitals  BP (!) 185/77 (Patient Position: Lying)   Pulse 63   Temp 36.5 °C (97.7 °F) (Oral)   Resp 17   Ht 5' (1.524 m)   Wt 48.2 kg (106 lb 4.2 oz)   SpO2 (!) 94%   Breastfeeding? No   BMI 20.75 kg/m²       Pain/Rand Score: Pain Assessment Performed: Yes (5/15/2018 12:23 PM)  Presence of Pain: complains of pain/discomfort (5/15/2018 12:23 PM)  Presence of Pain: complains of pain/discomfort (5/15/2018  8:22 AM)  Rand Score: 8 (5/15/2018 12:10 PM)

## 2018-05-15 NOTE — ANESTHESIA PREPROCEDURE EVALUATION
05/15/2018  Martha Terrell is a 89 y.o., female.    Pre-op Assessment    I have reviewed the Patient Summary Reports.     I have reviewed the Nursing Notes.   I have reviewed the Medications.     Review of Systems  Social:  Former Smoker    Hematology/Oncology:        Current/Recent Cancer. 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 Echo (4/2/18) CONCLUSIONS     1 - Mildly enlarged ascending aorta.     2 - No wall motion abnormalities.     3 - Normal left ventricular systolic function (EF 60-65%).     4 - Normal right ventricular systolic function .     5 - The estimated PA systolic pressure is 32 mmHg.     6 - Trivial to mild tricuspid regurgitation.    Renal/:   Urinary frequency   Hepatic/GI:   Hiatal Hernia,    Musculoskeletal:   Osteoporosis  Left tibia closed fracture   Neurological:   Neuromuscular Disease, ataxia   Psych:   Psychiatric History anxiety depression insomnia         Physical Exam  General:  Well nourished    Airway/Jaw/Neck:  Airway Findings: Mouth Opening: Normal Tongue: Normal  Pre-Existing Airway Tube(s): Oral Endotracheal tube  General Airway Assessment: Adult      Dental:  Dental Findings: upper partial dentures   Chest/Lungs:  Chest/Lungs Findings: Normal Respiratory Rate     Heart/Vascular:  Heart Findings: Rate: Normal  Rhythm: Regular Rhythm        Mental Status:  Mental Status Findings:  Cooperative, Alert and Oriented         Anesthesia Plan  Type of Anesthesia, risks & benefits discussed:  Anesthesia Type:  general  Patient's Preference:   Intra-op Monitoring Plan: standard ASA monitors  Intra-op Monitoring Plan Comments:   Post Op Pain Control Plan: per primary service following discharge from PACU and multimodal  analgesia  Post Op Pain Control Plan Comments:   Induction:   IV  Beta Blocker:  Patient is not currently on a Beta-Blocker (No further documentation required).       Informed Consent: Patient understands risks and agrees with Anesthesia plan.  Questions answered. Anesthesia consent signed with patient.  ASA Score: 2     Day of Surgery Review of History & Physical:    H&P update referred to the surgeon.     Anesthesia Plan Notes: Labs Hgb 12.8, Plt 246, K 5.3, Cr 0.8, Glu 100

## 2018-05-15 NOTE — INTERVAL H&P NOTE
The patient has been examined and the H&P has been reviewed:    I concur with the findings and no changes have occurred since H&P was written.    Anesthesia/Surgery risks, benefits and alternative options discussed and understood by patient/family.          Active Hospital Problems    Diagnosis  POA    Right breast cancer with T3 tumor, >5 cm in greatest dimension [C50.911]  Yes      Resolved Hospital Problems    Diagnosis Date Resolved POA   No resolved problems to display.

## 2018-05-15 NOTE — TELEPHONE ENCOUNTER
Pt having surgery tomorrow for breast CA. Was given 2 flat packets of hibiclens. Wants to know if she is supposed to use this after she showers and if she has to leave it on.  Reason for Disposition   Nursing judgment    Protocols used: ST NO GUIDELINE OR REFERENCE [id number]

## 2018-05-15 NOTE — PT/OT/SLP EVAL
Occupational Therapy   Evaluation    Name: Martha Terrell  MRN: [id number]  Admitting Diagnosis:  <principal problem not specified> Day of Surgery    Recommendations:     Discharge Recommendations: rehabilitation facility  Discharge Equipment Recommendations:  none  Barriers to discharge:       History:     Occupational Profile:  Living Environment: lives alone in apart met with 18 steps to enter  Previous level of function: occupational therapy  Equipment Owned:  none  Assistance upon Discharge:     Past Medical History:   Diagnosis Date    Anxiety     Cataract     ERMS (embryonal rhabdomyosarcoma)     OS    ERMS (embryonal rhabdomyosarcoma) 1/27/2016    OS     Glaucoma     Hiatal hernia 4/16/13    EGD    Insomnia     falling asleep    OP (osteoporosis)     Right breast cancer with T3 tumor, >5 cm in greatest dimension 3/27/2018       Past Surgical History:   Procedure Laterality Date    CATARACT EXTRACTION W/  INTRAOCULAR LENS IMPLANT Right 03/10/2017    iStent     HYSTERECTOMY      at age 41    PCIOL Right 03/10/2017    DR. OTTO    SLT OS  4/25/13    TONSILLECTOMY      at age 6       Subjective     Chief Complaint: debility and generalized weakness  Patient/Family stated goals:   Communicated with: nurse and epic chart review prior to session.  Pain/Comfort:  · Pain Rating 1: 3/10  · Location - Side 1: Right    Patients cultural, spiritual, Yazidi conflicts given the current situation:      Objective:     Patient found with: peripheral IV    General Precautions: Standard, fall   Orthopedic Precautions:N/A   Braces: N/A     Occupational Performance:    Bed Mobility:    · Patient completed Rolling/Turning to Left with  minimum assistance  · Patient completed Rolling/Turning to Right with minimum assistance  · Patient completed Scooting/Bridging with minimum assistance  · Patient completed Supine to Sit with minimum assistance  · Patient completed Sit to Supine with minimum assistance    Functional  "Mobility/Transfers:  · Patient completed Sit <> Stand Transfer with minimum assistance and of x2 persons  with  hand held assist   · Functional Mobility: pt ambulated 30 feet with min a x 1-2 people with      Activities of Daily Living:  · UB Dressing: maximal assistance x1  · LB Dressing: minimum assistance x1    Cognitive/Visual Perceptual:  Cognitive/Psychosocial Skills:     -       Oriented to: Person, Place, Time and Situation   -       Follows Commands/attention:Follows one-step commands  -       Communication: clear/fluent  -       Memory: Impaired STM  -       Safety awareness/insight to disability: impaired   Visual/Perceptual:  -Intact    Physical Exam:  Upper Extremity Range of Motion:     -       Right Upper Extremity: not tested  -       Left Upper Extremity: WFL    Patient left HOB elevated with all lines intact, call button in reach and nurse notified    AMPAC 6 Click:  AMPAC Total Score: 14    Treatment & Education:  Pt educated on post mastectomy program . Pt educated not to start hep til further notice from md  Education:    Assessment:     Martha Terrell is a 89 y.o. female with a medical diagnosis of <principal problem not specified>.  She presents with performance deficits affecting function are weakness, impaired self care skills, impaired balance, decreased coordination, decreased safety awareness, impaired endurance, impaired functional mobilty, gait instability.      Rehab Prognosis:  Fair+ patient would benefit from acute skilled OT services to address these deficits and reach maximum level of function.         Clinical Decision Makin.  OT Mod:  "Pt evaluation falls under moderate complexity for evaluation coding due to identification of 3-5 performance deficits noted as stated above. Eval required Min/Mod assistance to complete on this date and detailed assessment(s) were utilized. Moreover, an expanded review of history and occupational profile obtained with additional review of " "cognitive, physical and psychosocial hx."     Plan:     Patient to be seen 3 x/week to address the above listed problems via self-care/home management, therapeutic activities, therapeutic exercises  · Plan of Care Expires: 05/22/18  · Plan of Care Reviewed with: patient    This Plan of care has been discussed with the patient who was involved in its development and understands and is in agreement with the identified goals and treatment plan    GOALS:    Occupational Therapy Goals        Problem: Occupational Therapy Goal    Goal Priority Disciplines Outcome Interventions   Occupational Therapy Goal     OT, PT/OT     Description:  Goals to be met by: 5-22-18       Patient will increase functional independence with ADLs by performing:    LE dressing sba    Toileting from toilet with Set-up Assistance for hygiene and clothing management.   Upper extremity exercise program x 10-15 reps per handout, with post mastectomy program  sba with le dressing                      Time Tracking:     OT Date of Treatment: 05/15/18  OT Start Time: 1345  OT Stop Time: 1415  OT Total Time (min): 30 min    Billable Minutes:Evaluation 15 minutes  Therapeutic Activity 15 minutes    Lori Lovelace OT  5/15/2018    "

## 2018-05-15 NOTE — OP NOTE
[facility]  Surgery Department  Operative Note    SUMMARY     Date of Procedure: 5/15/2018     Procedure: Procedure(s) (LRB):  MASTECTOMY-MODIFIED RADICAL (Right)  BIOPSY-LYMPH NODE (Right)     Surgeon(s) and Role:     * Kingston Metcalf MD - Primary     * Trent Alexander MD - Assisting        Pre-Operative Diagnosis: Breast cancer metastasized to axillary lymph node, right [C50.911, C77.3]  Right breast cancer with T3 tumor, >5 cm in greatest dimension [C50.911]    Post-Operative Diagnosis: Post-Op Diagnosis Codes:     * Breast cancer metastasized to axillary lymph node, right [C50.911, C77.3]     * Right breast cancer with T3 tumor, >5 cm in greatest dimension [C50.911]    Anesthesia: General    Technical Procedures Used:  Modified radical mastectomy and supraclavicular lymph node biopsy    Description of the Findings of the Procedure:     Patient was brought into the operative room placed on the operative table in supine position.  General endotracheal anesthesia was induced. IV antibiotics were administered.  Pneumatic compression devices placed on lower extremity.  The right chest wall, axilla, arm to the elbow and neck were prepped and draped in a standard fashion.    A time-out was performed.    An elliptical incision was made around the right breast.  Skin and subcutaneous tissues were divided with electrocautery.  Superior and inferior skin flaps were raised.  The breast tissue was then  from the pectoralis muscle to include the fascia of the pectoralis muscle.  Hemostasis was achieved with Ligaclips and the Harmonic scalpel as well as electrocautery. Once the breast was removed it was tagged with a short suture superiorly and long suture laterally.    The axillary dissection was carried out by removing the fatty tissue that contained numerous lymph nodes from below the axillary vein. Vessels were either divided with Harmonic scalpel or clipped proximally and divided distally with  the Harmonic scalpel.  During this process the long thoracic and thoracodorsal nerves were identified and preserved.  There were multiple lymph nodes next to the thoracodorsal nerve.  The lateral brachial cutaneous nerve was identified and sacrificed.    Hemostasis was ensured.  The chest wall and axilla were irrigated.    A small supraclavicular incision was made over the palpable lymph node subcutaneous tissues were dissected using the cautery. The lymph node was identified and excised with the Harmonic scalpel. The wound was irrigated.  Deep layers were closed with 3 0 Vicryl.  The skin with 3 0 nylon in an interrupted fashion.    Two Ross-Hickman drains were then placed through the lateral chest wall with the lateral drain going to the axilla and the medial drain going to the chest wall.  The mastectomy incision was closed by approximating the dermis with interrupted 3 0 Vicryl sutures.  The skin was closed with 3 0 nylon in a running fashion.    The drains were secured with 2 will self.    Dry sterile dressings were placed    Significant Surgical Tasks Conducted by the Assistant(s), if Applicable:  Assistance with mastectomy and axillary dissection to minimize operating time in a [age]-year-old patient    Complications: No    Estimated Blood Loss (EBL): 50 mL           Implants: * No implants in log *    Specimens:   Specimen (12h ago through future)    Start     Ordered    05/15/18 1050  Specimen to Pathology - Surgery  Once     Comments:  1) Right breast tissue with stitch marking --short superior, long lateral  perm2) right  level 2  Lymph  Node  Perm3) Right supra- claviclar lymph  node   perm4)right axillary contents   permDX right breast cancer      05/15/18 1049                  Condition: Stable    Disposition: PACU - hemodynamically stable.    Attestation: I performed the procedure.

## 2018-05-15 NOTE — TRANSFER OF CARE
Anesthesia Transfer of Care Note    Patient: Martha Terrell    Procedure(s) Performed: Procedure(s) (LRB):  MASTECTOMY-MODIFIED RADICAL (Right)  BIOPSY-LYMPH NODE (Right)    Patient location: PACU    Anesthesia Type: general    Transport from OR: Transported from OR on room air with adequate spontaneous ventilation    Post pain: adequate analgesia    Post assessment: no apparent anesthetic complications and tolerated procedure well    Post vital signs: stable    Level of consciousness: awake    Nausea/Vomiting: no nausea/vomiting    Complications: none    Transfer of care protocol was followed      Last vitals:   Visit Vitals  BP (!) 185/88 (BP Location: Right arm, Patient Position: Sitting)   Pulse 62   Temp 36.9 °C (98.4 °F) (Skin)   Resp 17   Ht 5' (1.524 m)   Wt 48.2 kg (106 lb 4.2 oz)   SpO2 96%   Breastfeeding? No   BMI 20.75 kg/m²

## 2018-05-16 LAB
ANION GAP SERPL CALC-SCNC: 9 MMOL/L
BASOPHILS # BLD AUTO: 0.01 K/UL
BASOPHILS NFR BLD: 0.1 %
BUN SERPL-MCNC: 11 MG/DL
CALCIUM SERPL-MCNC: 9.4 MG/DL
CHLORIDE SERPL-SCNC: 105 MMOL/L
CO2 SERPL-SCNC: 26 MMOL/L
CREAT SERPL-MCNC: 0.7 MG/DL
DIFFERENTIAL METHOD: ABNORMAL
EOSINOPHIL # BLD AUTO: 0.1 K/UL
EOSINOPHIL NFR BLD: 0.8 %
ERYTHROCYTE [DISTWIDTH] IN BLOOD BY AUTOMATED COUNT: 14.5 %
EST. GFR  (AFRICAN AMERICAN): >60 ML/MIN/1.73 M^2
EST. GFR  (NON AFRICAN AMERICAN): >60 ML/MIN/1.73 M^2
GLUCOSE SERPL-MCNC: 100 MG/DL
HCT VFR BLD AUTO: 38.4 %
HGB BLD-MCNC: 12.2 G/DL
LYMPHOCYTES # BLD AUTO: 1.7 K/UL
LYMPHOCYTES NFR BLD: 19 %
MCH RBC QN AUTO: 28.9 PG
MCHC RBC AUTO-ENTMCNC: 31.8 G/DL
MCV RBC AUTO: 91 FL
MONOCYTES # BLD AUTO: 1 K/UL
MONOCYTES NFR BLD: 11.1 %
NEUTROPHILS # BLD AUTO: 6.1 K/UL
NEUTROPHILS NFR BLD: 69 %
PLATELET # BLD AUTO: 198 K/UL
PMV BLD AUTO: 9.9 FL
POTASSIUM SERPL-SCNC: 4.1 MMOL/L
RBC # BLD AUTO: 4.22 M/UL
SODIUM SERPL-SCNC: 140 MMOL/L
WBC # BLD AUTO: 8.85 K/UL

## 2018-05-16 PROCEDURE — 27000221 HC OXYGEN, UP TO 24 HOURS

## 2018-05-16 PROCEDURE — 80048 BASIC METABOLIC PNL TOTAL CA: CPT

## 2018-05-16 PROCEDURE — 36415 COLL VENOUS BLD VENIPUNCTURE: CPT

## 2018-05-16 PROCEDURE — 97116 GAIT TRAINING THERAPY: CPT

## 2018-05-16 PROCEDURE — 11000001 HC ACUTE MED/SURG PRIVATE ROOM

## 2018-05-16 PROCEDURE — 97530 THERAPEUTIC ACTIVITIES: CPT

## 2018-05-16 PROCEDURE — 21400001 HC TELEMETRY ROOM

## 2018-05-16 PROCEDURE — 97110 THERAPEUTIC EXERCISES: CPT

## 2018-05-16 PROCEDURE — 25000003 PHARM REV CODE 250: Performed by: SURGERY

## 2018-05-16 PROCEDURE — 85025 COMPLETE CBC W/AUTO DIFF WBC: CPT

## 2018-05-16 RX ADMIN — TIMOLOL MALEATE 1 DROP: 5 SOLUTION OPHTHALMIC at 09:05

## 2018-05-16 RX ADMIN — HYDROCODONE BITARTRATE AND ACETAMINOPHEN 1 TABLET: 5; 325 TABLET ORAL at 01:05

## 2018-05-16 RX ADMIN — SODIUM CHLORIDE, SODIUM LACTATE, POTASSIUM CHLORIDE, AND CALCIUM CHLORIDE: .6; .31; .03; .02 INJECTION, SOLUTION INTRAVENOUS at 03:05

## 2018-05-16 RX ADMIN — CHLORHEXIDINE GLUCONATE 10 ML: 1.2 RINSE ORAL at 09:05

## 2018-05-16 RX ADMIN — LATANOPROST 1 DROP: 50 SOLUTION OPHTHALMIC at 09:05

## 2018-05-16 RX ADMIN — SERTRALINE HYDROCHLORIDE 50 MG: 50 TABLET ORAL at 09:05

## 2018-05-16 RX ADMIN — BRIMONIDINE TARTRATE 1 DROP: 2 SOLUTION OPHTHALMIC at 09:05

## 2018-05-16 RX ADMIN — OXYBUTYNIN CHLORIDE 10 MG: 5 TABLET, EXTENDED RELEASE ORAL at 09:05

## 2018-05-16 RX ADMIN — SODIUM CHLORIDE, SODIUM LACTATE, POTASSIUM CHLORIDE, AND CALCIUM CHLORIDE: .6; .31; .03; .02 INJECTION, SOLUTION INTRAVENOUS at 01:05

## 2018-05-16 NOTE — PT/OT/SLP PROGRESS
Occupational Therapy      Patient Name:  Martha Terrell   MRN:  [id number]    S: pt cooperative education on POA.  O: friends/ pt educated on post mastectomy program. Friends verbalzed understanding postmastectomy program.  A: continue to work toward (i) with hep of postmastectomy program  P: continue with poc  Lori Lovelace OT  5/16/2018   [id number]  1 ta

## 2018-05-16 NOTE — PT/OT/SLP PROGRESS
Physical Therapy  Treatment    Martha Terrell   MRN: [id number]   Admitting Diagnosis: Right breast cancer with T3 tumor, >5 cm in greatest dimension    PT Received On: 05/16/18  PT Start Time: 0901     PT Stop Time: 0925    PT Total Time (min): 24 min       Billable Minutes:  Gait Training 14 and Neuromuscular Re-education 10    Treatment Type: Treatment  PT/PTA: PT             General Precautions: Standard, fall  Orthopedic Precautions: N/A   Braces: N/A         Subjective:  Communicated with nurse best and epic chart review prior to session.  Pt agreed to tx     Pain/Comfort  Pain Rating 1: 2/10  Location - Side 1: Right  Location 1: axilla  Pain Rating Post-Intervention 1: 2/10    Objective:   Patient found with: peripheral IV, oxygen    Functional Mobility:  PT MET IN RM SUP>SIT EOB WITH SBA. PT STOOD WITH HHA FOR GT 2X80'.  WITH O2 IN TOW. PT RETURNED TO RM T/F TO BEDSIDE FOR B LE TE X 20 REPS OF AP, TKE, MIP. PT STOOD FOR GT TO BATHROOM WITH CGA TO AND FROM COMMODE . PT AT SINK FOR GROOMING AND RETURNED SUP IN BED WITH S. PT LEFT WITH BED ALARM ON AND ALL NEEDS MET.      AM-PAC 6 CLICK MOBILITY  How much help from another person does this patient currently need?   1 = Unable, Total/Dependent Assistance  2 = A lot, Maximum/Moderate Assistance  3 = A little, Minimum/Contact Guard/Supervision  4 = None, Modified [place]/Independent    Turning over in bed (including adjusting bedclothes, sheets and blankets)?: 4  Sitting down on and standing up from a chair with arms (e.g., wheelchair, bedside commode, etc.): 3  Moving from lying on back to sitting on the side of the bed?: 4  Moving to and from a bed to a chair (including a wheelchair)?: 3  Need to walk in hospital room?: 3  Climbing 3-5 steps with a railing?: 1  Total Score: 18    AM-PAC Raw Score CMS G-Code Modifier Level of Impairment Assistance   6 % Total / Unable   7 - 9 CM 80 - 100% Maximal Assist   10 - 14 CL 60 - 80% Moderate Assist   15 - 19  CK 40 - 60% Moderate Assist   20 - 22 CJ 20 - 40% Minimal Assist   23 CI 1-20% SBA / CGA   24 CH 0% Independent/ Mod I     Patient left supine with call button in reach and bed alarm on.    Assessment:  PT PROGRESSING WITH GT TRAINING HOWEVER CONT TO REQUIRES MIN >CGA FOR GROSS FUNC MOBILITY .    Rehab identified problem list/impairments: Rehab identified problem list/impairments: weakness, impaired endurance, impaired functional mobilty, gait instability, decreased upper extremity function, impaired balance, decreased ROM    Rehab potential is good.    Activity tolerance: Good    Discharge recommendations: Discharge Facility/Level Of Care Needs: rehabilitation facility     Barriers to discharge:      Equipment recommendations: Equipment Needed After Discharge: walker, rolling     GOALS:    Physical Therapy Goals        Problem: Physical Therapy Goal    Goal Priority Disciplines Outcome Goal Variances Interventions   Physical Therapy Goal     PT/OT, PT Ongoing (interventions implemented as appropriate)     Description:  PT WILL BE SEEN FOR P.T. FOR A MIN OF 5 OUT OF 7 DAYS A WEEK  LT18  1. PT WILL COMPLETE BED MOBILITY IND  2. PT WILL T/F TO CHAIR WITH S.  3. PT WILL GT TRAIN X 150' WITH OR WITHOUT RW WITH SBA.  4. PT WILL COMPLETE B LE TE X 20 REPS                    PLAN:    Patient to be seen    to address the above listed problems via gait training, therapeutic activities, therapeutic exercises  Plan of Care expires: 18  Plan of Care reviewed with: patient         Alicia Harris, PT  2018

## 2018-05-16 NOTE — PT/OT/SLP PROGRESS
Occupational Therapy  Treatment    Martha Terrell   MRN: [id number]   Admitting Diagnosis: Right breast cancer with T3 tumor, >5 cm in greatest dimension    OT Date of Treatment: 05/16/18   OT Start Time: 1320  OT Stop Time: 1400  OT Total Time (min): 40 min    Billable Minutes:  Therapeutic Activity 30 minutes and Therapeutic Exercise 10 minutes    General Precautions: Standard, fall  Orthopedic Precautions: N/A  Braces:           Subjective:  Communicated with nurse Mata and epic chart review prior to session.    Pain/Comfort  Pain Rating 1: 0/10    Objective:  Patient found with: peripheral IV, oxygen     Functional Mobility:  Bed Mobility:       Transfers:        Functional Ambulation: na    Activities of Daily Living:     Feeding adaptive equipment: na     UE adaptive equipment: na     LE adaptive equipment: na                    Bathing adaptive equipment: na    Balance:   Static Sit: GOOD-: Takes MODERATE challenges from all directions but inconsistently  Dynamic Sit: GOOD: Maintains balance through MODERATE excursions of active trunk movement  Static Stand: na  Dynamic stand: na    Therapeutic Activities and Exercises:  Pt seen in room. Pt req sba with long sitting in bed. Pt educated on postmastectomy program. Pt educated not to start hep of program til further notice from md. Pt was unable to consistently verbalize understanding.pt performed 1set x 10 reps b ue rom exercise of postmastectomy program on l  ue.  Pt left supine in bed with hob slightl. Pt left with all needs met.    AM-PAC 6 CLICK ADL   How much help from another person does this patient currently need?   1 = Unable, Total/Dependent Assistance  2 = A lot, Maximum/Moderate Assistance  3 = A little, Minimum/Contact Guard/Supervision  4 = None, Modified [place]/Independent    Putting on and taking off regular lower body clothing? : 3 (per pt report)  Bathing (including washing, rinsing, drying)?: 3  Toileting, which includes using toilet,  "bedpan, or urinal? : 3  Putting on and taking off regular upper body clothing?: 2  Taking care of personal grooming such as brushing teeth?: 3  Eating meals?: 3  Total Score: 17     AM-PAC Raw Score CMS "G-Code Modifier Level of Impairment Assistance   6 % Total / Unable   7 - 8 CM 80 - 100% Maximal Assist   9-13 CL 60 - 80% Moderate Assist   14 - 19 CK 40 - 60% Moderate Assist   20 - 22 CJ 20 - 40% Minimal Assist   23 CI 1-20% SBA / CGA   24 CH 0% Independent/ Mod I       Patient left HOB elevated with all lines intact, call button in reach, bed alarm on and nurse notified    ASSESSMENT:  Martha Terrell is a 89 y.o. female with a medical diagnosis of Right breast cancer with T3 tumor, >5 cm in greatest dimension and presents with debility and generalized weakness.    Rehab identified problem list/impairments: Rehab identified problem list/impairments: weakness, impaired self care skills, impaired balance, decreased coordination, decreased safety awareness, impaired endurance, impaired functional mobilty, gait instability, decreased lower extremity function    Rehab potential is good.    Activity tolerance: Good    Discharge recommendations: Discharge Facility/Level Of Care Needs: rehabilitation facility     Barriers to discharge: Barriers to Discharge: None    Equipment recommendations: walker, rolling     GOALS:    Occupational Therapy Goals        Problem: Occupational Therapy Goal    Goal Priority Disciplines Outcome Interventions   Occupational Therapy Goal     OT, PT/OT Ongoing (interventions implemented as appropriate)    Description:  Goals to be met by: 5-22-18       Patient will increase functional independence with ADLs by performing:    LE dressing sba    Toileting from toilet with Set-up Assistance for hygiene and clothing management.   Upper extremity exercise program x 10-15 reps per handout, with post mastectomy program  sba with le dressing                      Plan:  Patient to be seen 3 " x/week to address the above listed problems via self-care/home management, therapeutic activities, therapeutic exercises  Plan of Care expires: 05/22/18  Plan of Care reviewed with: patient         Lori Lovelace, OT  05/16/2018

## 2018-05-16 NOTE — HOSPITAL COURSE
POD1: s/p mastectomy, axillary dissection, sc lymph node biopsy. Drain output mostly clear. Pain minimal.     POD 2 sleepy but arouses  mild pain, seen by case management, working on snf placement    POD 3 :  Pain controlled, oriented, awaiting SNF placement as she lives alone and no family to help

## 2018-05-16 NOTE — HPI
Martha Terrell is a 89 y.o. female with breast cancer who presented for a right modified radical mastectomy and LN biopsy.    she was found to have a large breast cancer that has spread to her axillary lymph nodes.  She is estrogen and progesterone receptor negative.  She was seen by oncology and she is not a candidate for chemotherapy.     She was seen by cardiology and she has a low risk of cardiac events despite her age of 89.     She complains today of some memory problems and a rash of her chest     She states that she understands that she has to have surgery to prevent the tumor from growing through the skin

## 2018-05-16 NOTE — PROGRESS NOTES
[facility] -   General Surgery  Progress Note    Subjective:     History of Present Illness:  Martha Terrell is a 89 y.o. female with breast cancer who presented for a right modified radical mastectomy and LN biopsy.     Post-Op Info:  Procedure(s) (LRB):  MASTECTOMY-MODIFIED RADICAL (Right)  BIOPSY-LYMPH NODE (Right)   1 Day Post-Op     Interval History: no complaints    Medications:  Continuous Infusions:   lactated ringers 80 mL/hr at 05/16/18 0113     Scheduled Meds:   brimonidine 0.2%  1 drop Both Eyes BID    chlorhexidine  10 mL Mouth/Throat BID    latanoprost  1 drop Both Eyes QHS    nozaseptin   Each Nare BID    oxybutynin  10 mg Oral Daily    sertraline  50 mg Oral QHS    timolol maleate 0.5%  1 drop Both Eyes BID     PRN Meds:hydrocodone-acetaminophen, HYDROmorphone, ondansetron     Review of patient's allergies indicates:   Allergen Reactions    Amlodipine      Other reaction(s): heart pounding    Benazepril      Other reaction(s): COUGH    Dyazide  [triamterene-hydrochlorothiazid]      Other reaction(s): Rash     Objective:     Vital Signs (Most Recent):  Temp: 97.8 °F (36.6 °C) (05/16/18 0732)  Pulse: 72 (05/16/18 0732)  Resp: 18 (05/16/18 0732)  BP: (!) 188/75 (05/16/18 0732)  SpO2: 97 % (05/16/18 0732) Vital Signs (24h Range):  Temp:  [97.7 °F (36.5 °C)-98.4 °F (36.9 °C)] 97.8 °F (36.6 °C)  Pulse:  [53-79] 72  Resp:  [12-23] 18  SpO2:  [92 %-99 %] 97 %  BP: (107-188)/(56-77) 188/75     Weight: 48.2 kg (106 lb 4.2 oz)  Body mass index is 20.75 kg/m².    Intake/Output - Last 3 Shifts       05/14 0700 - 05/15 0659 05/15 0700 - 05/16 0659 05/16 0700 - 05/17 0659    P.O.  360     I.V. (mL/kg)  2254.3 (46.8)     Total Intake(mL/kg)  2614.3 (54.2)     Urine (mL/kg/hr)  1800 (1.6) 200 (2)    Drains  180 (0.2)     Blood  50 (0)     Total Output   2030 200    Net   +584.3 -200           Urine Occurrence  6 x     Stool Occurrence  0 x           Physical Exam   Constitutional: She is  oriented to person, place, and time. She appears well-developed and well-nourished.   HENT:   Head: Normocephalic and atraumatic.   Eyes: EOM are normal.   Cardiovascular: Normal rate and regular rhythm.    Pulmonary/Chest: Effort normal. No respiratory distress.   Abdominal: Soft.   Musculoskeletal: Normal range of motion.   Neurological: She is alert and oriented to person, place, and time.   Skin: Skin is warm and dry.   Wound dressings are clean and dry. CARL drain is serosanguinous.    Psychiatric: She has a normal mood and affect. Thought content normal.   Vitals reviewed.      Significant Labs:  CBC:   Recent Labs  Lab 05/16/18  0441   WBC 8.85   RBC 4.22   HGB 12.2   HCT 38.4      MCV 91   MCH 28.9   MCHC 31.8*     CMP:   Recent Labs  Lab 05/16/18  0441      CALCIUM 9.4      K 4.1   CO2 26      BUN 11   CREATININE 0.7       Significant Diagnostics:  I have reviewed all pertinent imaging results/findings within the past 24 hours.    Assessment/Plan:     * Right breast cancer with T3 tumor, >5 cm in greatest dimension    POD1: pain controlled. CARL output serosanguinous.   Incentive spirometry  Full liquid diet  rehab facility recommended upon discharge              Lauren Lynch PA-C  General Surgery  [facility] - [place]

## 2018-05-16 NOTE — PLAN OF CARE
05/16/18 1539   CONRAD Message   Medicare Outpatient and Observation Notification regarding financial responsibility Given to patient/caregiver;Explained to patient/caregiver;Signed/date by patient/caregiver   Date CONRAD was signed 05/16/18   Time CONRAD was signed 153

## 2018-05-16 NOTE — PLAN OF CARE
Problem: Patient Care Overview  Goal: Plan of Care Review  PT REQUIRES MIN A FOR GT X 20' WITH HHA.   Outcome: Ongoing (interventions implemented as appropriate)  Plan of care reviewed and discussed with patient.  No acute distress noted.  Safety and fall precautions in place.  Patient free from injury.  Pain managed adequately.  Oral hydration and ambulation with assistance promoted.  IV hydration maintained.  Avasys in room.  CARL drain managed and drained.  Will continue to monitor.    12 hour chart check complete.

## 2018-05-16 NOTE — ASSESSMENT & PLAN NOTE
POD1: pain controlled. CARL output serosanguinous.   Incentive spirometry  Full liquid diet  rehab facility recommended upon discharge

## 2018-05-16 NOTE — CONSULTS
CM met with patient in attempt to complete discharge planning assessment and discuss SNF / HH options. Patient is alert and oriented x 3 with intermittent episodes of confusion. Patient states she would prefer to receive therapy in a skilled facility vs home health. CM presented patient with a list of in-network providers and proceeded to attempt discussion of SNF options. Patient states she does not understand and that she needs her glasses. Patient states she would like to wait until her friend Renetta comes to choose a facility. CM contacted patients friend Jocelynn Wells  ([phone])and informed her of MD order for SNF/HH post-discharge. Renetta states she would prefer for patient to go to a SNF because she lives alone. Renetta states she is patients POA.  CM asked Renetta if she could bring the POA with her for verification. Renetta states she was [place] [address] near the hospital, but she would go back home to get the POA. CM instructed Renetta to come to the second floor and ask for CM once she arrives to the hospital. Renetta verbalizes understanding.

## 2018-05-16 NOTE — MEDICAL/APP STUDENT
Subjective:       Patient ID: Martha Terrell is a 89 y.o. female.    Chief Complaint: Martha Terrell is 89 y.o. Female POD #1 s/p R radical mastecctomy and R lymph node biopsy.     Pt is comfortable, awake,  oriented to PPT.Pt has not ambulated, but shows adequate movement while mobilizing in bed. Is drinking minimal amounts of water and is still receiving IVF. Has not eaten. Pt states she has minimal pain. Reports difficulty sleeping overnight with <2 hrs sleep.    HPI    Review of Systems   Constitutional: Negative for fever.   HENT: Positive for congestion.    Respiratory: Negative for shortness of breath.    Gastrointestinal: Negative for abdominal pain.       Objective:       Temp:  [97.7 °F (36.5 °C)-98.4 °F (36.9 °C)] 98.4 °F (36.9 °C)  Pulse:  [53-79] 79  Resp:  [12-23] 16  SpO2:  [93 %-99 %] 98 %  BP: (107-185)/(56-88) 165/68     Physical Exam   Constitutional: She is active. No distress.   Cardiovascular: Normal rate, regular rhythm, normal heart sounds and intact distal pulses.    Pulmonary/Chest: Effort normal and breath sounds normal. No respiratory distress.   Abdominal: Soft. There is no tenderness.   Musculoskeletal: She exhibits no edema.   Neurological: She is alert.   Skin: Skin is warm.   No excess bleeding from the incision. Draining serosanguinous fluid in both drains.          Assessment:       1. Right breast cancer with T3 tumor, >5 cm in greatest dimension    2. Breast cancer metastasized to axillary lymph node, right        Plan:       1. Pain       - well controlled, continue current regimen  2. Post-Op      - Continue monitoring I/O      - d/c pending PO intake, ambulation, adequate               independent care      - Home care: consider arranging for in home nursing          care following d/c for wound care.      - f/u OT, PT  3. Falls/Osteoporosis      - Continue Vit D supplementation      - OT to reduce fall risk  4. Glaucoma      - continue brimonidine/timolol and latanoprost

## 2018-05-16 NOTE — PLAN OF CARE
CM met with the patient and her friend Jocelynn Mason. Discharge Planning Assessment completed. Jocelynn has POA and states that patient has no family. CM reviewed POA document. POA  doucmentis a financial POA only and not an healthcare POA document. Jocelynn and patient is aware of this information. Patient gives verbal consent for Jocelynn to proceed to help with SNF placement and to sign documents.  Patient lives alone and was IADLs prior to hospitalization.  CM discussed SNF placement with patient and Jocelynn.  Jocelynn and patient reviewed list of in-network SNF providers. Patients choices for SNF placement are [facility], [facility], [facility], and [facility]. [facility] signed by patient and friend Jocelynn. CM contact information provided for questions or concerns. Payor: [employer] MANAGED MEDICARE/[employer] MEDICARE HMO. CM submitted SNF referral along with supporting documentation to [facility], [facility], [facility], and [facility] via [employer]. CM receive phone call from Lianuha at [facility] states she will not know if she has a bed available until tomorrow.         05/16/18 1530   Discharge Assessment   Assessment Type Discharge Planning Assessment   Assessment information obtained from? Patient;Caregiver  (Jocelynn Mason (friend) [phone])   Communicated expected length of stay with patient/caregiver yes   Prior to hospitilization cognitive status: Alert/Oriented   Prior to hospitalization functional status: Independent   Current cognitive status: Alert/Oriented   Current Functional Status: Independent   Lives With alone   Able to Return to Prior Arrangements unable to determine at this time (comments)   Is patient able to care for self after discharge? Yes   Who are your caregiver(s) and their phone number(s)? Jocelynn Mason [phone]   Patient's perception of discharge disposition skilled nursing facility    Patient currently being followed by outpatient case management? Unable to determine (comments)   Patient currently receives any other outside agency services? No   Equipment Currently Used at Home none   Do you have any problems affording any of your prescribed medications? No   Is the patient taking medications as prescribed? yes   Transportation Available family or friend will provide   Dialysis Name and Scheduled days N/A   Does the patient receive services at the [facility]? No   Discharge Plan A Skilled Nursing Facility   Discharge Plan B Home Health   Patient/Family In Agreement With Plan yes

## 2018-05-16 NOTE — SUBJECTIVE & OBJECTIVE
Interval History: no complaints    Medications:  Continuous Infusions:   lactated ringers 80 mL/hr at 05/16/18 0113     Scheduled Meds:   brimonidine 0.2%  1 drop Both Eyes BID    chlorhexidine  10 mL Mouth/Throat BID    latanoprost  1 drop Both Eyes QHS    nozaseptin   Each Nare BID    oxybutynin  10 mg Oral Daily    sertraline  50 mg Oral QHS    timolol maleate 0.5%  1 drop Both Eyes BID     PRN Meds:hydrocodone-acetaminophen, HYDROmorphone, ondansetron     Review of patient's allergies indicates:   Allergen Reactions    Amlodipine      Other reaction(s): heart pounding    Benazepril      Other reaction(s): COUGH    Dyazide  [triamterene-hydrochlorothiazid]      Other reaction(s): Rash     Objective:     Vital Signs (Most Recent):  Temp: 97.8 °F (36.6 °C) (05/16/18 0732)  Pulse: 72 (05/16/18 0732)  Resp: 18 (05/16/18 0732)  BP: (!) 188/75 (05/16/18 0732)  SpO2: 97 % (05/16/18 0732) Vital Signs (24h Range):  Temp:  [97.7 °F (36.5 °C)-98.4 °F (36.9 °C)] 97.8 °F (36.6 °C)  Pulse:  [53-79] 72  Resp:  [12-23] 18  SpO2:  [92 %-99 %] 97 %  BP: (107-188)/(56-77) 188/75     Weight: 48.2 kg (106 lb 4.2 oz)  Body mass index is 20.75 kg/m².    Intake/Output - Last 3 Shifts       05/14 0700 - 05/15 0659 05/15 0700 - 05/16 0659 05/16 0700 - 05/17 0659    P.O.  360     I.V. (mL/kg)  2254.3 (46.8)     Total Intake(mL/kg)  2614.3 (54.2)     Urine (mL/kg/hr)  1800 (1.6) 200 (2)    Drains  180 (0.2)     Blood  50 (0)     Total Output   2030 200    Net   +584.3 -200           Urine Occurrence  6 x     Stool Occurrence  0 x           Physical Exam   Constitutional: She is oriented to person, place, and time. She appears well-developed and well-nourished.   HENT:   Head: Normocephalic and atraumatic.   Eyes: EOM are normal.   Cardiovascular: Normal rate and regular rhythm.    Pulmonary/Chest: Effort normal. No respiratory distress.   Abdominal: Soft.   Musculoskeletal: Normal range of motion.   Neurological: She is alert and  oriented to person, place, and time.   Skin: Skin is warm and dry.   Wound dressings are clean and dry. CARL drain is serosanguinous.    Psychiatric: She has a normal mood and affect. Thought content normal.   Vitals reviewed.      Significant Labs:  CBC:   Recent Labs  Lab 05/16/18  0441   WBC 8.85   RBC 4.22   HGB 12.2   HCT 38.4      MCV 91   MCH 28.9   MCHC 31.8*     CMP:   Recent Labs  Lab 05/16/18  0441      CALCIUM 9.4      K 4.1   CO2 26      BUN 11   CREATININE 0.7       Significant Diagnostics:  I have reviewed all pertinent imaging results/findings within the past 24 hours.

## 2018-05-16 NOTE — PLAN OF CARE
Problem: Physical Therapy Goal  Goal: Physical Therapy Goal  PT WILL BE SEEN FOR P.T. FOR A MIN OF 5 OUT OF 7 DAYS A WEEK  LT18  1. PT WILL COMPLETE BED MOBILITY IND  2. PT WILL T/F TO CHAIR WITH S.  3. PT WILL GT TRAIN X 150' WITH OR WITHOUT RW WITH SBA.  4. PT WILL COMPLETE B LE TE X 20 REPS   Outcome: Ongoing (interventions implemented as appropriate)  PT GT TRAINED 2X80' WITH HHA (MIN A) ON LEVEL INDOOR SURFACES WITH O2 IN TOW.

## 2018-05-16 NOTE — PLAN OF CARE
Problem: Occupational Therapy Goal  Goal: Occupational Therapy Goal  Goals to be met by: 5-22-18       Patient will increase functional independence with ADLs by performing:    LE dressing sba    Toileting from toilet with Set-up Assistance for hygiene and clothing management.   Upper extremity exercise program x 10-15 reps per handout, with post mastectomy program  sba with le dressing     Outcome: Ongoing (interventions implemented as appropriate)  Still working towards (i) with  hep

## 2018-05-16 NOTE — PLAN OF CARE
Problem: Patient Care Overview  Goal: Plan of Care Review  PT REQUIRES MIN A FOR GT X 20' WITH HHA.   Outcome: Ongoing (interventions implemented as appropriate)  Respirations even and unlabored, no distress noted on assessment, slight pain at times, no nausea or shortness of breath, right chest CARL drain x 2 to bulb suction, right chest dressing dry and intact, AvaSys, forgetful at times, bed alarm, scd's

## 2018-05-16 NOTE — PLAN OF CARE
Problem: Patient Care Overview  Goal: Plan of Care Review  PT REQUIRES MIN A FOR GT X 20' WITH HHA.   Outcome: Ongoing (interventions implemented as appropriate)  Patient remained free from injury. No c/o pain at this time. Calm. Watching TV. No distress noted. Oriented x3. Respirations even and non labored. IV patent and infusing. Avasys monitoring at bedside. Bed alarm.  Bed locked and low. Call light in reach. Safety measures in place. Will continue to monitor. Reviewed plan of care. Patient verbalized understanding and teach back.    12hr chart check complete.

## 2018-05-17 PROCEDURE — 25000003 PHARM REV CODE 250: Performed by: SURGERY

## 2018-05-17 PROCEDURE — 27000221 HC OXYGEN, UP TO 24 HOURS

## 2018-05-17 PROCEDURE — 11000001 HC ACUTE MED/SURG PRIVATE ROOM

## 2018-05-17 PROCEDURE — 97116 GAIT TRAINING THERAPY: CPT

## 2018-05-17 PROCEDURE — 97530 THERAPEUTIC ACTIVITIES: CPT

## 2018-05-17 RX ORDER — HYDROCODONE BITARTRATE AND ACETAMINOPHEN 5; 325 MG/1; MG/1
1 TABLET ORAL EVERY 6 HOURS PRN
Qty: 20 TABLET | Refills: 0 | Status: SHIPPED | OUTPATIENT
Start: 2018-05-17 | End: 2018-06-26 | Stop reason: SDUPTHER

## 2018-05-17 RX ADMIN — TIMOLOL MALEATE 1 DROP: 5 SOLUTION OPHTHALMIC at 10:05

## 2018-05-17 RX ADMIN — TIMOLOL MALEATE 1 DROP: 5 SOLUTION OPHTHALMIC at 09:05

## 2018-05-17 RX ADMIN — OXYBUTYNIN CHLORIDE 10 MG: 5 TABLET, EXTENDED RELEASE ORAL at 09:05

## 2018-05-17 RX ADMIN — CHLORHEXIDINE GLUCONATE 10 ML: 1.2 RINSE ORAL at 09:05

## 2018-05-17 RX ADMIN — CHLORHEXIDINE GLUCONATE 10 ML: 1.2 RINSE ORAL at 10:05

## 2018-05-17 RX ADMIN — BRIMONIDINE TARTRATE 1 DROP: 2 SOLUTION OPHTHALMIC at 09:05

## 2018-05-17 RX ADMIN — SODIUM CHLORIDE, SODIUM LACTATE, POTASSIUM CHLORIDE, AND CALCIUM CHLORIDE: .6; .31; .03; .02 INJECTION, SOLUTION INTRAVENOUS at 03:05

## 2018-05-17 RX ADMIN — SERTRALINE HYDROCHLORIDE 50 MG: 50 TABLET ORAL at 10:05

## 2018-05-17 RX ADMIN — BRIMONIDINE TARTRATE 1 DROP: 2 SOLUTION OPHTHALMIC at 10:05

## 2018-05-17 RX ADMIN — BACITRACIN ZINC NEOMYCIN SULFATE POLYMYXIN B SULFATE: 400; 3.5; 5 OINTMENT TOPICAL at 03:05

## 2018-05-17 RX ADMIN — LATANOPROST 1 DROP: 50 SOLUTION OPHTHALMIC at 10:05

## 2018-05-17 NOTE — PT/OT/SLP PROGRESS
Physical Therapy  Treatment    Martha Terrell   MRN: [id number]   Admitting Diagnosis: Right breast cancer with T3 tumor, >5 cm in greatest dimension    PT Received On: 05/17/18  PT Start Time: 1230     PT Stop Time: 1255    PT Total Time (min): 25 min       Billable Minutes:  Gait Training 10 and Therapeutic Activity 15    Treatment Type: Treatment  PT/PTA: PT             General Precautions: Standard, fall  Orthopedic Precautions: N/A   Braces: N/A         Subjective:  Communicated with EPIC AND NURSE LEILA prior to session.  PT SLEEPY BUT AGREES TO THERAPY    Pain/Comfort  Pain Rating 1: 0/10    Objective:   Patient found with: oxygen    Functional Mobility:  Bed Mobility: TENZIN    Transfers: TENZIN    Gait: MIN HHA 2 X 45 FEET INTO HALLWAY AND BACK, UNSTEADY AND MINOR LOSS OF BALANCE, SELF CORRECTED WITH TENZIN      Therapeutic Activities and Exercises:  PT SAT ON SIDE OF BED FOR A WHILE PRIOR TO GAIT,  AMBULATED AND THEN SET UP ON SIDE OF BED FOR LUNCH.  TRAY SET UP IN FRONT OF HER, AND LEFT WITH ALL NEEDS MET.     AM-PAC 6 CLICK MOBILITY  How much help from another person does this patient currently need?   1 = Unable, Total/Dependent Assistance  2 = A lot, Maximum/Moderate Assistance  3 = A little, Minimum/Contact Guard/Supervision  4 = None, Modified [place]/Independent    Turning over in bed (including adjusting bedclothes, sheets and blankets)?: 4  Sitting down on and standing up from a chair with arms (e.g., wheelchair, bedside commode, etc.): 3  Moving from lying on back to sitting on the side of the bed?: 3  Moving to and from a bed to a chair (including a wheelchair)?: 3  Need to walk in hospital room?: 3  Climbing 3-5 steps with a railing?: 1  Total Score: 17    AM-PAC Raw Score CMS G-Code Modifier Level of Impairment Assistance   6 % Total / Unable   7 - 9 CM 80 - 100% Maximal Assist   10 - 14 CL 60 - 80% Moderate Assist   15 - 19 CK 40 - 60% Moderate Assist   20 - 22 CJ 20 - 40% Minimal Assist    23 CI 1-20% SBA / CGA   24 CH 0% Independent/ Mod I     Patient left SITTING ON SIDE OF BED with all lines intact, call button in reach, NURSE notified and SITTER present.    Assessment:  Martha Terrell is a 89 y.o. female with a medical diagnosis of Right breast cancer with T3 tumor, >5 cm in greatest dimension and presents with IMPAIRED MOBILITY AND WILL NEED CONT THERAPY.    Rehab identified problem list/impairments: Rehab identified problem list/impairments: weakness, impaired endurance, impaired balance, gait instability, impaired functional mobilty, impaired coordination, decreased safety awareness    Rehab potential is good.    Activity tolerance: Good    Discharge recommendations: Discharge Facility/Level Of Care Needs: rehabilitation facility / SNF    Barriers to discharge:      Equipment recommendations: Equipment Needed After Discharge: walker, rolling     GOALS:    Physical Therapy Goals        Problem: Physical Therapy Goal    Goal Priority Disciplines Outcome Goal Variances Interventions   Physical Therapy Goal     PT/OT, PT Ongoing (interventions implemented as appropriate)     Description:  PT WILL BE SEEN FOR P.T. FOR A MIN OF 5 OUT OF 7 DAYS A WEEK  LT18  1. PT WILL COMPLETE BED MOBILITY IND  2. PT WILL T/F TO CHAIR WITH S.  3. PT WILL GT TRAIN X 150' WITH OR WITHOUT RW WITH SBA.  4. PT WILL COMPLETE B LE TE X 20 REPS                    PLAN:    Patient to be seen    to address the above listed problems via gait training, therapeutic activities, therapeutic exercises  Plan of Care expires: 18  Plan of Care reviewed with: patient    PT G-Codes  Score: 17    Heather Washington PT  2018

## 2018-05-17 NOTE — PLAN OF CARE
Problem: Patient Care Overview  Goal: Plan of Care Review  PT REQUIRES MIN A FOR GT X 20' WITH HHA.   Outcome: Ongoing (interventions implemented as appropriate)  Plan of care reviewed and discussed with patient.  No acute distress noted.  Safety and precautions in place.  Patient free from injury.  Denies pain.  IV hydration stopped.  Regular diet initiated.  Continuous oxygen.  Every shift dressing change done per orders.  Sitter at bedside.      12 hour chart check complete.

## 2018-05-17 NOTE — SUBJECTIVE & OBJECTIVE
Interval History: pain controlled, SNF placement in progress    Medications:  Continuous Infusions:   lactated ringers 80 mL/hr at 05/17/18 0325     Scheduled Meds:   brimonidine 0.2%  1 drop Both Eyes BID    chlorhexidine  10 mL Mouth/Throat BID    latanoprost  1 drop Both Eyes QHS    neomycin-bacitracin-polymyxin   Topical (Top) Daily    nozaseptin   Each Nare BID    oxybutynin  10 mg Oral Daily    sertraline  50 mg Oral QHS    timolol maleate 0.5%  1 drop Both Eyes BID     PRN Meds:hydrocodone-acetaminophen, HYDROmorphone, ondansetron     Review of patient's allergies indicates:   Allergen Reactions    Amlodipine      Other reaction(s): heart pounding    Benazepril      Other reaction(s): COUGH    Dyazide  [triamterene-hydrochlorothiazid]      Other reaction(s): Rash     Objective:     Vital Signs (Most Recent):  Temp: 98.9 °F (37.2 °C) (05/17/18 0739)  Pulse: 64 (05/17/18 0739)  Resp: 20 (05/17/18 0739)  BP: (!) 149/70 (05/17/18 0739)  SpO2: 96 % (05/17/18 0739) Vital Signs (24h Range):  Temp:  [98 °F (36.7 °C)-98.9 °F (37.2 °C)] 98.9 °F (37.2 °C)  Pulse:  [64-77] 64  Resp:  [16-20] 20  SpO2:  [94 %-97 %] 96 %  BP: (111-168)/(54-79) 149/70     Weight: 48.2 kg (106 lb 4.2 oz)  Body mass index is 20.75 kg/m².    Intake/Output - Last 3 Shifts       05/15 0700 - 05/16 0659 05/16 0700 - 05/17 0659 05/17 0700 - 05/18 0659    P.O. 360 360     I.V. (mL/kg) 2254.3 (46.8) 1910.7 (39.6)     Total Intake(mL/kg) 2614.3 (54.2) 2270.7 (47.1)     Urine (mL/kg/hr) 1800 (1.6) 1400 (1.2)     Drains 180 (0.2) 100 (0.1) 60 (1.2)    Blood 50 (0)      Total Output 2030 1500 60    Net +584.3 +770.7 -60           Urine Occurrence 6 x      Stool Occurrence 0 x            Physical Exam   Constitutional: She is oriented to person, place, and time. No distress.   Elderly, frail   HENT:   Head: Normocephalic and atraumatic.   Neck: Normal range of motion.   Cardiovascular: Normal rate, regular rhythm and normal heart sounds.     Pulmonary/Chest: Effort normal and breath sounds normal.   Abdominal: Soft. Bowel sounds are normal.   Neurological: She is oriented to person, place, and time.   Skin: Skin is warm and dry. Capillary refill takes less than 2 seconds.   Right neck and chest wall incisions are clean   Psychiatric: She has a normal mood and affect. Her behavior is normal. Thought content normal.   Vitals reviewed.      Significant Labs:  CBC:   Recent Labs  Lab 05/16/18  0441   WBC 8.85   RBC 4.22   HGB 12.2   HCT 38.4      MCV 91   MCH 28.9   MCHC 31.8*     BMP:   Recent Labs  Lab 05/16/18  0441         K 4.1      CO2 26   BUN 11   CREATININE 0.7   CALCIUM 9.4       Significant Diagnostics:  none

## 2018-05-17 NOTE — CONSULTS
AICHA spoke with Kat in admission at [facility]. Kat states she has a female bed available for SNF. Referral submitted through [facility] for review.

## 2018-05-17 NOTE — PLAN OF CARE
Problem: Patient Care Overview  Goal: Plan of Care Review  PT REQUIRES MIN A FOR GT X 20' WITH HHA.   Outcome: Ongoing (interventions implemented as appropriate)  POC reviewed, including indications and possible side effects of administered medications. Patient verbalized understanding and teach back. No adverse reactions noted. Patient denies pain. Administered medications per order. D/C Avasys. Sitter at bedside. Patient remains free of injuries and falls during shift. Will continue to monitor.     12 hour chart check complete.

## 2018-05-17 NOTE — PROGRESS NOTES
[facility] -   General Surgery  Progress Note    Subjective:     History of Present Illness:  Martha Terrell is a 89 y.o. female with breast cancer who presented for a right modified radical mastectomy and LN biopsy.     Post-Op Info:  Procedure(s) (LRB):  MASTECTOMY-MODIFIED RADICAL (Right)  BIOPSY-LYMPH NODE (Right)   2 Days Post-Op     Interval History: pain controlled, SNF placement in progress    Medications:  Continuous Infusions:   lactated ringers 80 mL/hr at 05/17/18 0325     Scheduled Meds:   brimonidine 0.2%  1 drop Both Eyes BID    chlorhexidine  10 mL Mouth/Throat BID    latanoprost  1 drop Both Eyes QHS    neomycin-bacitracin-polymyxin   Topical (Top) Daily    nozaseptin   Each Nare BID    oxybutynin  10 mg Oral Daily    sertraline  50 mg Oral QHS    timolol maleate 0.5%  1 drop Both Eyes BID     PRN Meds:hydrocodone-acetaminophen, HYDROmorphone, ondansetron     Review of patient's allergies indicates:   Allergen Reactions    Amlodipine      Other reaction(s): heart pounding    Benazepril      Other reaction(s): COUGH    Dyazide  [triamterene-hydrochlorothiazid]      Other reaction(s): Rash     Objective:     Vital Signs (Most Recent):  Temp: 98.9 °F (37.2 °C) (05/17/18 0739)  Pulse: 64 (05/17/18 0739)  Resp: 20 (05/17/18 0739)  BP: (!) 149/70 (05/17/18 0739)  SpO2: 96 % (05/17/18 0739) Vital Signs (24h Range):  Temp:  [98 °F (36.7 °C)-98.9 °F (37.2 °C)] 98.9 °F (37.2 °C)  Pulse:  [64-77] 64  Resp:  [16-20] 20  SpO2:  [94 %-97 %] 96 %  BP: (111-168)/(54-79) 149/70     Weight: 48.2 kg (106 lb 4.2 oz)  Body mass index is 20.75 kg/m².    Intake/Output - Last 3 Shifts       05/15 0700 - 05/16 0659 05/16 0700 - 05/17 0659 05/17 0700 - 05/18 0659    P.O. 360 360     I.V. (mL/kg) 2254.3 (46.8) 1910.7 (39.6)     Total Intake(mL/kg) 2614.3 (54.2) 2270.7 (47.1)     Urine (mL/kg/hr) 1800 (1.6) 1400 (1.2)     Drains 180 (0.2) 100 (0.1) 60 (1.2)    Blood 50 (0)      Total Output 2030 1500  60    Net +584.3 +770.7 -60           Urine Occurrence 6 x      Stool Occurrence 0 x            Physical Exam   Constitutional: She is oriented to person, place, and time. No distress.   Elderly, frail   HENT:   Head: Normocephalic and atraumatic.   Neck: Normal range of motion.   Cardiovascular: Normal rate, regular rhythm and normal heart sounds.    Pulmonary/Chest: Effort normal and breath sounds normal.   Abdominal: Soft. Bowel sounds are normal.   Neurological: She is oriented to person, place, and time.   Skin: Skin is warm and dry. Capillary refill takes less than 2 seconds.   Right neck and chest wall incisions are clean   Psychiatric: She has a normal mood and affect. Her behavior is normal. Thought content normal.   Vitals reviewed.      Significant Labs:  CBC:   Recent Labs  Lab 05/16/18  0441   WBC 8.85   RBC 4.22   HGB 12.2   HCT 38.4      MCV 91   MCH 28.9   MCHC 31.8*     BMP:   Recent Labs  Lab 05/16/18  0441         K 4.1      CO2 26   BUN 11   CREATININE 0.7   CALCIUM 9.4       Significant Diagnostics:  none    Assessment/Plan:     * Right breast cancer with T3 tumor, >5 cm in greatest dimension    POD2: pain controlled. CARL output serosanguinous.   Incentive spirometry  regualred  Skilled nursing facility recommended upon discharge  Can be discharged today if SNF placement can be arranged              Kingston Metcalf MD  General Surgery  [facility] -

## 2018-05-17 NOTE — PLAN OF CARE
CM received phone call from [facility] she received referral for SNF placement. [facility] she will check if a bed is available and give CM a return phone call.

## 2018-05-17 NOTE — ASSESSMENT & PLAN NOTE
POD2: pain controlled. CARL output serosanguinous.   Incentive spirometry  regualred  Skilled nursing facility recommended upon discharge  Can be discharged today if SNF placement can be arranged

## 2018-05-17 NOTE — PLAN OF CARE
Problem: Physical Therapy Goal  Goal: Physical Therapy Goal  PT WILL BE SEEN FOR P.T. FOR A MIN OF 5 OUT OF 7 DAYS A WEEK  LT18  1. PT WILL COMPLETE BED MOBILITY IND  2. PT WILL T/F TO CHAIR WITH S.  3. PT WILL GT TRAIN X 150' WITH OR WITHOUT RW WITH SBA.  4. PT WILL COMPLETE B LE TE X 20 REPS   Outcome: Ongoing (interventions implemented as appropriate)  Pt required TENZIN with bed mobility and transfers,  And MIN HHA for gait 2 x 45 feet

## 2018-05-17 NOTE — CONSULTS
AICHA spoke to Nida with [facility]. Nida confirms she has an available bed and that she is submitting an authorization request today to patients insurance today. Nida states she will call AICHA tomorrow when she gets the approval. AICHA notified JOSE G Duggan and patient's Nurse Kaila of this information.

## 2018-05-17 NOTE — PLAN OF CARE
"AICHA received phone call from Kelly with [facility]. Kelly states "we will not have a bed available until early next week." AICHA called patient's friend Renetta and left a voice meal informing her there are no beds available at [facility] until next week and that SNF placement is progress at one of the other facility choices, [facility]. CM contact information provided.   "

## 2018-05-18 VITALS
DIASTOLIC BLOOD PRESSURE: 55 MMHG | HEART RATE: 73 BPM | OXYGEN SATURATION: 95 % | TEMPERATURE: 98 F | RESPIRATION RATE: 16 BRPM | WEIGHT: 106.25 LBS | HEIGHT: 60 IN | SYSTOLIC BLOOD PRESSURE: 117 MMHG | BODY MASS INDEX: 20.86 KG/M2

## 2018-05-18 PROCEDURE — 27000221 HC OXYGEN, UP TO 24 HOURS

## 2018-05-18 PROCEDURE — 25000003 PHARM REV CODE 250: Performed by: SURGERY

## 2018-05-18 PROCEDURE — 97116 GAIT TRAINING THERAPY: CPT

## 2018-05-18 PROCEDURE — 97530 THERAPEUTIC ACTIVITIES: CPT

## 2018-05-18 PROCEDURE — 94760 N-INVAS EAR/PLS OXIMETRY 1: CPT

## 2018-05-18 RX ADMIN — CHLORHEXIDINE GLUCONATE 10 ML: 1.2 RINSE ORAL at 10:05

## 2018-05-18 RX ADMIN — TIMOLOL MALEATE 1 DROP: 5 SOLUTION OPHTHALMIC at 10:05

## 2018-05-18 RX ADMIN — OXYBUTYNIN CHLORIDE 10 MG: 5 TABLET, EXTENDED RELEASE ORAL at 10:05

## 2018-05-18 RX ADMIN — BACITRACIN ZINC NEOMYCIN SULFATE POLYMYXIN B SULFATE: 400; 3.5; 5 OINTMENT TOPICAL at 10:05

## 2018-05-18 RX ADMIN — BRIMONIDINE TARTRATE 1 DROP: 2 SOLUTION OPHTHALMIC at 10:05

## 2018-05-18 RX ADMIN — BACITRACIN ZINC NEOMYCIN SULFATE POLYMYXIN B SULFATE: 400; 3.5; 5 OINTMENT TOPICAL at 01:05

## 2018-05-18 NOTE — PLAN OF CARE
Problem: Physical Therapy Goal  Goal: Physical Therapy Goal  PT WILL BE SEEN FOR P.T. FOR A MIN OF 5 OUT OF 7 DAYS A WEEK  LT18  1. PT WILL COMPLETE BED MOBILITY IND  2. PT WILL T/F TO CHAIR WITH S.  3. PT WILL GT TRAIN X 150' WITH OR WITHOUT RW WITH SBA.  4. PT WILL COMPLETE B LE TE X 20 REPS   Outcome: Ongoing (interventions implemented as appropriate)  PT GT TRAINED WITH RW AND CGA X 450'

## 2018-05-18 NOTE — ASSESSMENT & PLAN NOTE
POD 3: pain controlled. CARL output serosanguinous.   Incentive spirometry  Tolerating a regular diet   Skilled nursing facility recommended upon discharge  Can be discharged today if SNF placement can be arranged

## 2018-05-18 NOTE — PLAN OF CARE
Problem: Patient Care Overview  Goal: Plan of Care Review  PT REQUIRES MIN A FOR GT X 20' WITH HHA.   Outcome: Outcome(s) achieved Date Met: 05/18/18  Remains injury free. Denies c/o pain. Transferred to SNF. Stable condition.

## 2018-05-18 NOTE — DISCHARGE SUMMARY
[facility] -   General Surgery  Discharge Summary      Patient Name: Martha Terrell  MRN: [id number]  Admission Date: 5/15/2018  Hospital Length of Stay: 2 days  Discharge Date and Time:  05/18/2018 9:42 AM  Attending Physician: Lisa Miner MD   Discharging Provider: Lisa Miner MD  Primary Care Provider: Nichelle Cruz MD    HPI:   Martha Terrell is a 89 y.o. female with breast cancer who presented for a right modified radical mastectomy and LN biopsy.    she was found to have a large breast cancer that has spread to her axillary lymph nodes.  She is estrogen and progesterone receptor negative.  She was seen by oncology and she is not a candidate for chemotherapy.     She was seen by cardiology and she has a low risk of cardiac events despite her age of 89.     She complains today of some memory problems and a rash of her chest     She states that she understands that she has to have surgery to prevent the tumor from growing through the skin     Procedure(s) (LRB):  MASTECTOMY-MODIFIED RADICAL (Right)  BIOPSY-LYMPH NODE (Right)      Indwelling Lines/Drains at time of discharge:   Lines/Drains/Airways     Drain                 Closed/Suction Drain 05/15/18 1029 Right;Lateral Other (Comment) Bulb 10 Fr. 2 days         Closed/Suction Drain 05/15/18 1032 Right;Medial Breast Bulb 10 Fr. 2 days              Hospital Course: POD1: s/p mastectomy, axillary dissection, sc lymph node biopsy. Drain output mostly clear. Pain minimal.     POD 2 sleepy but arouses  mild pain, seen by case management, working on snf placement    POD 3 :  Pain controlled, oriented, awaiting SNF placement as she lives alone and no family to help    Consults:   Consults         Status Ordering Provider     Inpatient consult to Social Work/Case Management  Once     Provider:  (Not yet assigned)    Completed LISA MINER          Significant Diagnostic Studies:   Labs: BMP: No results for input(s): GLU, NA, K, CL, CO2,  BUN, CREATININE, CALCIUM, MG in the last 48 hours. and CMP No results for input(s): NA, K, CL, CO2, GLU, BUN, CREATININE, CALCIUM, PROT, ALBUMIN, BILITOT, ALKPHOS, AST, ALT, ANIONGAP, ESTGFRAFRICA, EGFRNONAA in the last 48 hours.  Specimen (12h ago through future)    None          Pending Diagnostic Studies:     None        Final Active Diagnoses:    Diagnosis Date Noted POA    PRINCIPAL PROBLEM:  Right breast cancer with T3 tumor, >5 cm in greatest dimension [C50.911] 03/27/2018 Yes      Problems Resolved During this Admission:    Diagnosis Date Noted Date Resolved POA      Discharged Condition: stable    Disposition: Skilled Nursing Facility    Follow Up:  Follow-up Information     Kingston Metcalf MD In 10 days.    Specialty:  General Surgery  Why:  post op appt, or as scheduled  Contact information:  [address]  [place] [id number] [phone]                 Patient Instructions:     Diet Adult Regular     Activity as tolerated     Lifting restrictions   Order Comments: No lifting more than 30 pounds for 2 weeks with right arm     Notify your health care provider if you experience any of the following:  temperature >100.4     Notify your health care provider if you experience any of the following:  persistent nausea and vomiting or diarrhea     Notify your health care provider if you experience any of the following:  severe uncontrolled pain     Notify your health care provider if you experience any of the following:  redness, tenderness, or signs of infection (pain, swelling, redness, odor or green/yellow discharge around incision site)     Change dressing (specify)   Order Comments: dDressing change: daily with antibiotic ointment to sutures   antibiotic ointment and drain sponge to timothy sites  Change daily    Empty drains and record output twice a day.       Medications:  Reconciled Home Medications:      Medication List      START taking these medications    HYDROcodone-acetaminophen 5-325 mg  per tablet  Commonly known as:  NORCO  Take 1 tablet by mouth every 6 (six) hours as needed.     neomycin-bacitracin-polymyxin ointment  Commonly known as:  NEOSPORIN  Apply topically once daily.     nozaseptin nasal   Commonly known as:  NOZIN  1 application by Each Nare route 2 (two) times daily.        CONTINUE taking these medications    aspirin 81 MG EC tablet  Commonly known as:  ECOTRIN  Take 81 mg by mouth as needed for Pain.     B complex-C-E-Zn Cpsr  Take 1 capsule by mouth.     brimonidine-timolol 0.2-0.5 % Drop  Commonly known as:  COMBIGAN  Place 1 drop into both eyes 2 (two) times daily. PLEASE DISPENSE A 90 DAY SUPPLY     CALCIUM 600 WITH VITAMIN D3 600 mg(1,500mg) -200 unit Tab  Generic drug:  calcium-vitamin D3  Take 1 tablet by mouth once daily.     latanoprost 0.005 % ophthalmic solution  Place 1 drop into both eyes every evening. PLEASE DISPENSE A 3 MONTH SUPPLY     lutein 6 mg Cap  Take 1 capsule by mouth once daily.     sertraline 50 MG tablet  Commonly known as:  ZOLOFT  TAKE ONE-HALF TO ONE TABLET BY MOUTH IN THE EVENING     tolterodine 4 MG 24 hr capsule  Commonly known as:  DETROL LA  TAKE ONE CAPSULE BY MOUTH ONCE DAILY     VITAMIN C 500 MG tablet  Generic drug:  ascorbic acid (vitamin C)  Take 500 mg by mouth once daily.     vitamin E 400 UNIT capsule  Take 400 Units by mouth once daily.          Time spent on the discharge of patient: 2 minutes    Kingston Metcalf MD  General Surgery  [facility] -

## 2018-05-18 NOTE — SUBJECTIVE & OBJECTIVE
Interval History: no pain, tolerated a diet awaiting placememnt    Medications:  Continuous Infusions:  Scheduled Meds:   brimonidine 0.2%  1 drop Both Eyes BID    chlorhexidine  10 mL Mouth/Throat BID    latanoprost  1 drop Both Eyes QHS    neomycin-bacitracin-polymyxin   Topical (Top) Daily    nozaseptin   Each Nare BID    oxybutynin  10 mg Oral Daily    sertraline  50 mg Oral QHS    timolol maleate 0.5%  1 drop Both Eyes BID     PRN Meds:HYDROcodone-acetaminophen, HYDROmorphone, ondansetron     Review of patient's allergies indicates:   Allergen Reactions    Amlodipine      Other reaction(s): heart pounding    Benazepril      Other reaction(s): COUGH    Dyazide  [triamterene-hydrochlorothiazid]      Other reaction(s): Rash     Objective:     Vital Signs (Most Recent):  Temp: 98.1 °F (36.7 °C) (05/18/18 0749)  Pulse: (!) 57 (05/18/18 0749)  Resp: 16 (05/18/18 0749)  BP: (!) 164/75 (05/18/18 0749)  SpO2: 95 % (05/18/18 0749) Vital Signs (24h Range):  Temp:  [98.1 °F (36.7 °C)-98.7 °F (37.1 °C)] 98.1 °F (36.7 °C)  Pulse:  [57-84] 57  Resp:  [16-20] 16  SpO2:  [95 %-98 %] 95 %  BP: (105-164)/(56-75) 164/75     Weight: 48.2 kg (106 lb 4.2 oz)  Body mass index is 20.75 kg/m².    Intake/Output - Last 3 Shifts       05/16 0700 - 05/17 0659 05/17 0700 - 05/18 0659 05/18 0700 - 05/19 0659    P.O. 360 340     I.V. (mL/kg) 1910.7 (39.6)      Total Intake(mL/kg) 2270.7 (47.1) 340 (7.1)     Urine (mL/kg/hr) 1400 (1.2) 200 (0.2)     Drains 100 (0.1) 115 (0.1) 35 (0.4)    Total Output 1500 315 35    Net +770.7 +25 -35           Urine Occurrence  4 x 1 x          Physical Exam   Constitutional: She is oriented to person, place, and time.   Thin, frail   HENT:   Head: Normocephalic.   Eyes: Pupils are equal, round, and reactive to light.   Neck: Normal range of motion.   Incision is clean   Cardiovascular: Normal rate, regular rhythm and normal heart sounds.    Pulmonary/Chest: Effort normal and breath sounds normal.    Abdominal: Soft. Bowel sounds are normal.   Musculoskeletal: She exhibits no edema.   Neurological: She is oriented to person, place, and time.   Skin: Skin is warm and dry. Capillary refill takes less than 2 seconds.   Left chest incision is clean  CARL drains are serous   Psychiatric: She has a normal mood and affect. Her behavior is normal. Judgment and thought content normal.   Vitals reviewed.      Significant Labs:  CBC:   Recent Labs  Lab 05/16/18 0441   WBC 8.85   RBC 4.22   HGB 12.2   HCT 38.4      MCV 91   MCH 28.9   MCHC 31.8*     BMP:   Recent Labs  Lab 05/16/18  0441         K 4.1      CO2 26   BUN 11   CREATININE 0.7   CALCIUM 9.4     CMP:   Recent Labs  Lab 05/16/18  0441      CALCIUM 9.4      K 4.1   CO2 26      BUN 11   CREATININE 0.7       Significant Diagnostics:   none

## 2018-05-18 NOTE — PROGRESS NOTES
[facility] -   General Surgery  Progress Note    Subjective:     History of Present Illness:  Martha Terrell is a 89 y.o. female with breast cancer who presented for a right modified radical mastectomy and LN biopsy.    she was found to have a large breast cancer that has spread to her axillary lymph nodes.  She is estrogen and progesterone receptor negative.  She was seen by oncology and she is not a candidate for chemotherapy.     She was seen by cardiology and she has a low risk of cardiac events despite her age of 89.     She complains today of some memory problems and a rash of her chest     She states that she understands that she has to have surgery to prevent the tumor from growing through the skin     Post-Op Info:  Procedure(s) (LRB):  MASTECTOMY-MODIFIED RADICAL (Right)  BIOPSY-LYMPH NODE (Right)   3 Days Post-Op     Interval History: no pain, tolerated a diet awaiting placememnt    Medications:  Continuous Infusions:  Scheduled Meds:   brimonidine 0.2%  1 drop Both Eyes BID    chlorhexidine  10 mL Mouth/Throat BID    latanoprost  1 drop Both Eyes QHS    neomycin-bacitracin-polymyxin   Topical (Top) Daily    nozaseptin   Each Nare BID    oxybutynin  10 mg Oral Daily    sertraline  50 mg Oral QHS    timolol maleate 0.5%  1 drop Both Eyes BID     PRN Meds:HYDROcodone-acetaminophen, HYDROmorphone, ondansetron     Review of patient's allergies indicates:   Allergen Reactions    Amlodipine      Other reaction(s): heart pounding    Benazepril      Other reaction(s): COUGH    Dyazide  [triamterene-hydrochlorothiazid]      Other reaction(s): Rash     Objective:     Vital Signs (Most Recent):  Temp: 98.1 °F (36.7 °C) (05/18/18 0749)  Pulse: (!) 57 (05/18/18 0749)  Resp: 16 (05/18/18 0749)  BP: (!) 164/75 (05/18/18 0749)  SpO2: 95 % (05/18/18 0749) Vital Signs (24h Range):  Temp:  [98.1 °F (36.7 °C)-98.7 °F (37.1 °C)] 98.1 °F (36.7 °C)  Pulse:  [57-84] 57  Resp:  [16-20] 16  SpO2:  [95 %-98  %] 95 %  BP: (105-164)/(56-75) 164/75     Weight: 48.2 kg (106 lb 4.2 oz)  Body mass index is 20.75 kg/m².    Intake/Output - Last 3 Shifts       05/16 0700 - 05/17 0659 05/17 0700 - 05/18 0659 05/18 0700 - 05/19 0659    P.O. 360 340     I.V. (mL/kg) 1910.7 (39.6)      Total Intake(mL/kg) 2270.7 (47.1) 340 (7.1)     Urine (mL/kg/hr) 1400 (1.2) 200 (0.2)     Drains 100 (0.1) 115 (0.1) 35 (0.4)    Total Output 1500 315 35    Net +770.7 +25 -35           Urine Occurrence  4 x 1 x          Physical Exam   Constitutional: She is oriented to person, place, and time.   Thin, frail   HENT:   Head: Normocephalic.   Eyes: Pupils are equal, round, and reactive to light.   Neck: Normal range of motion.   Incision is clean   Cardiovascular: Normal rate, regular rhythm and normal heart sounds.    Pulmonary/Chest: Effort normal and breath sounds normal.   Abdominal: Soft. Bowel sounds are normal.   Musculoskeletal: She exhibits no edema.   Neurological: She is oriented to person, place, and time.   Skin: Skin is warm and dry. Capillary refill takes less than 2 seconds.   Left chest incision is clean  CARL drains are serous   Psychiatric: She has a normal mood and affect. Her behavior is normal. Judgment and thought content normal.   Vitals reviewed.      Significant Labs:  CBC:   Recent Labs  Lab 05/16/18  0441   WBC 8.85   RBC 4.22   HGB 12.2   HCT 38.4      MCV 91   MCH 28.9   MCHC 31.8*     BMP:   Recent Labs  Lab 05/16/18  0441         K 4.1      CO2 26   BUN 11   CREATININE 0.7   CALCIUM 9.4     CMP:   Recent Labs  Lab 05/16/18  0441      CALCIUM 9.4      K 4.1   CO2 26      BUN 11   CREATININE 0.7       Significant Diagnostics:   none    Assessment/Plan:     * Right breast cancer with T3 tumor, >5 cm in greatest dimension    POD 3: pain controlled. CARL output serosanguinous.   Incentive spirometry  Tolerating a regular diet   Skilled nursing facility recommended upon discharge  Can  be discharged today if SNF placement can be arranged              Kingston Metcalf MD  General Surgery  [facility] [place]

## 2018-05-18 NOTE — PT/OT/SLP PROGRESS
Physical Therapy  Treatment    Martha Terrell   MRN: [id number]   Admitting Diagnosis: Right breast cancer with T3 tumor, >5 cm in greatest dimension    PT Received On: 05/18/18  PT Start Time: 1052     PT Stop Time: 1115    PT Total Time (min): 23 min       Billable Minutes:  Gait Training 13 and Therapeutic Activity 10    Treatment Type: Treatment  PT/PTA: PT             General Precautions: Standard, fall  Orthopedic Precautions: N/A   Braces: N/A         Subjective:  Communicated with NURSE AND EPIC CHART REVIEW  prior to session.   PT AGREED TO TX     Pain/Comfort  Pain Rating 1: 0/10  Pain Rating Post-Intervention 1: 0/10    Objective:   Patient found with: peripheral IV, oxygen    Functional Mobility:  PT SUP>SIT EOB WITH S. PT STOOD WITH RW AND CGA FOR GT X 450' WITH RW AND RETURNED TO  FOR TOILETING WITH SBA. PT STOOD AT SINK FOR GROOMING AND T/F TO EOB WITH CGA. PT SUP IN BED WITH S. AND LEFT WITH ALL NEEDS MET.     AM-PAC 6 CLICK MOBILITY  How much help from another person does this patient currently need?   1 = Unable, Total/Dependent Assistance  2 = A lot, Maximum/Moderate Assistance  3 = A little, Minimum/Contact Guard/Supervision  4 = None, Modified [place]/Independent    Turning over in bed (including adjusting bedclothes, sheets and blankets)?: 4  Sitting down on and standing up from a chair with arms (e.g., wheelchair, bedside commode, etc.): 3  Moving from lying on back to sitting on the side of the bed?: 4  Moving to and from a bed to a chair (including a wheelchair)?: 3  Need to walk in hospital room?: 3  Climbing 3-5 steps with a railing?: 1  Total Score: 18    AM-PAC Raw Score CMS G-Code Modifier Level of Impairment Assistance   6 % Total / Unable   7 - 9 CM 80 - 100% Maximal Assist   10 - 14 CL 60 - 80% Moderate Assist   15 - 19 CK 40 - 60% Moderate Assist   20 - 22 CJ 20 - 40% Minimal Assist   23 CI 1-20% SBA / CGA   24 CH 0% Independent/ Mod I     Patient left supine with call  button in reach.    Assessment:  PT TERESA TX WELL WITHOUT COMPLAINTS    Rehab identified problem list/impairments: Rehab identified problem list/impairments: weakness, impaired balance, impaired functional mobilty, impaired endurance, decreased safety awareness, decreased lower extremity function, gait instability    Rehab potential is excellent.    Activity tolerance: Good    Discharge recommendations: Discharge Facility/Level Of Care Needs: rehabilitation facility     Barriers to discharge:      Equipment recommendations: Equipment Needed After Discharge: walker, rolling     GOALS:    Physical Therapy Goals        Problem: Physical Therapy Goal    Goal Priority Disciplines Outcome Goal Variances Interventions   Physical Therapy Goal     PT/OT, PT Ongoing (interventions implemented as appropriate)     Description:  PT WILL BE SEEN FOR P.T. FOR A MIN OF 5 OUT OF 7 DAYS A WEEK  LT18  1. PT WILL COMPLETE BED MOBILITY IND  2. PT WILL T/F TO CHAIR WITH S.  3. PT WILL GT TRAIN X 150' WITH OR WITHOUT RW WITH SBA.  4. PT WILL COMPLETE B LE TE X 20 REPS                     PLAN:    Patient to be seen    to address the above listed problems via gait training, therapeutic activities, therapeutic exercises  Plan of Care expires: 18  Plan of Care reviewed with: patient         Alicia Harris, PT  2018

## 2018-05-18 NOTE — PLAN OF CARE
Problem: Patient Care Overview  Goal: Plan of Care Review  PT REQUIRES MIN A FOR GT X 20' WITH HHA.   Outcome: Ongoing (interventions implemented as appropriate)  1:1 status. Remained free from injury. Ambulates to bathroom with assistance. Wound care performed. 12hr chart check complete.

## 2018-05-21 ENCOUNTER — PATIENT OUTREACH (OUTPATIENT)
Dept: ADMINISTRATIVE | Facility: CLINIC | Age: 83
End: 2018-05-21

## 2018-05-22 ENCOUNTER — PATIENT MESSAGE (OUTPATIENT)
Dept: SURGERY | Facility: CLINIC | Age: 83
End: 2018-05-22

## 2018-05-23 ENCOUNTER — PATIENT MESSAGE (OUTPATIENT)
Dept: SURGERY | Facility: CLINIC | Age: 83
End: 2018-05-23

## 2018-05-25 ENCOUNTER — PES CALL (OUTPATIENT)
Dept: ADMINISTRATIVE | Facility: CLINIC | Age: 83
End: 2018-05-25

## 2018-05-28 ENCOUNTER — OFFICE VISIT (OUTPATIENT)
Dept: SURGERY | Facility: CLINIC | Age: 83
End: 2018-05-28
Payer: MEDICARE

## 2018-05-28 ENCOUNTER — OFFICE VISIT (OUTPATIENT)
Dept: INTERNAL MEDICINE | Facility: CLINIC | Age: 83
End: 2018-05-28
Payer: MEDICARE

## 2018-05-28 VITALS
SYSTOLIC BLOOD PRESSURE: 115 MMHG | HEART RATE: 76 BPM | WEIGHT: 106 LBS | DIASTOLIC BLOOD PRESSURE: 78 MMHG | TEMPERATURE: 98 F | BODY MASS INDEX: 20.7 KG/M2

## 2018-05-28 VITALS
BODY MASS INDEX: 20.82 KG/M2 | SYSTOLIC BLOOD PRESSURE: 120 MMHG | DIASTOLIC BLOOD PRESSURE: 74 MMHG | HEIGHT: 60 IN | OXYGEN SATURATION: 96 % | HEART RATE: 76 BPM | WEIGHT: 106.06 LBS | TEMPERATURE: 98 F

## 2018-05-28 DIAGNOSIS — Z90.11 STATUS POST MASTECTOMY, RIGHT: ICD-10-CM

## 2018-05-28 DIAGNOSIS — J30.9 ALLERGIC RHINITIS, UNSPECIFIED SEASONALITY, UNSPECIFIED TRIGGER: Primary | ICD-10-CM

## 2018-05-28 DIAGNOSIS — C77.3 BREAST CANCER METASTASIZED TO AXILLARY LYMPH NODE, RIGHT: Primary | ICD-10-CM

## 2018-05-28 DIAGNOSIS — C50.911 BREAST CANCER METASTASIZED TO AXILLARY LYMPH NODE, RIGHT: Primary | ICD-10-CM

## 2018-05-28 PROCEDURE — 99999 PR PBB SHADOW E&M-EST. PATIENT-LVL IV: CPT | Mod: PBBFAC,,, | Performed by: NURSE PRACTITIONER

## 2018-05-28 PROCEDURE — 99024 POSTOP FOLLOW-UP VISIT: CPT | Mod: S$GLB,,, | Performed by: SURGERY

## 2018-05-28 PROCEDURE — 99213 OFFICE O/P EST LOW 20 MIN: CPT | Mod: S$GLB,,, | Performed by: NURSE PRACTITIONER

## 2018-05-28 PROCEDURE — 99999 PR PBB SHADOW E&M-EST. PATIENT-LVL III: CPT | Mod: PBBFAC,,, | Performed by: SURGERY

## 2018-05-28 RX ORDER — MONTELUKAST SODIUM 10 MG/1
10 TABLET ORAL NIGHTLY
Qty: 30 TABLET | Refills: 0 | Status: SHIPPED | OUTPATIENT
Start: 2018-05-28 | End: 2018-06-26

## 2018-05-28 RX ORDER — FLUTICASONE PROPIONATE 50 MCG
1 SPRAY, SUSPENSION (ML) NASAL DAILY
Qty: 16 G | Refills: 3 | Status: SHIPPED | OUTPATIENT
Start: 2018-05-28 | End: 2018-06-26

## 2018-05-28 NOTE — PROGRESS NOTES
Subjective:       Patient ID: Martha Terrell is a 89 y.o. female.    Chief Complaint: allergies and Chest Congestion    Patient presents with congestion. She reports congestion for past several days and allergy symptoms. She was previously treated for allergies with singulair and had significant improvement in symptoms. She has not tried anything recently to alleviate symptoms.       Review of Systems   Constitutional: Negative for chills and fever.   HENT: Positive for congestion and rhinorrhea. Negative for ear pain, hearing loss, postnasal drip, sneezing and sore throat.    Respiratory: Negative for cough and shortness of breath.    Neurological: Negative for headaches.       Objective:      Physical Exam   Constitutional: She is oriented to person, place, and time. She appears well-developed and well-nourished.   HENT:   Head: Normocephalic and atraumatic.   Nose: Mucosal edema and rhinorrhea present.   Pale boggy nasal turbinates with clear mucosa.   Eyes: Conjunctivae and EOM are normal. Right eye exhibits no discharge. Left eye exhibits no discharge.   Neck: Normal range of motion. Neck supple.   Cardiovascular: Normal rate and regular rhythm.  Exam reveals no friction rub.    No murmur heard.  Pulmonary/Chest: Effort normal and breath sounds normal. No respiratory distress. She has no wheezes.   Neurological: She is alert and oriented to person, place, and time.   Skin: Skin is warm and dry. No rash noted.   Vitals reviewed.      Assessment:       1. Allergic rhinitis, unspecified seasonality, unspecified trigger        Plan:   Allergic rhinitis, unspecified seasonality, unspecified trigger  -     montelukast (SINGULAIR) 10 mg tablet; Take 1 tablet (10 mg total) by mouth every evening.  Dispense: 30 tablet; Refill: 0  -     fluticasone (FLONASE) 50 mcg/actuation nasal spray; 1 spray (50 mcg total) by Each Nare route once daily.  Dispense: 16 g; Refill: 3        Follow-up if symptoms worsen or fail to  improve.

## 2018-05-28 NOTE — PROGRESS NOTES
Subjective:       Patient ID: Martha Terrell is a 89 y.o. female.    Post modified radical mastectomy and supraclavicular lymph node biopsy on the right    Chief Complaint: Post-op Evaluation    Patient had a locally advanced right breast cancer with palpable axillary supraclavicular adenopathy.  She underwent a right modified radical mastectomy supraclavicular lymph node biopsy.  She presents now for follow-up from skilled nursing unit.    The patient caregiver states that she appears to be in her normal activity level      Review of Systems   Skin:        Postoperative pain is resolved       Objective:      Physical Exam   Constitutional: No distress.   Frail   Cardiovascular: Normal rate, regular rhythm and normal heart sounds.    Pulmonary/Chest: Effort normal.   Abdominal: Soft.   Skin:   The right mastectomy incision is healing well.  The sutures were removed.  The axillary drain was removed.    The supraclavicular incision is healing well.  The sutures were removed.    Steri-Strips were placed.  There is a slight fullness at the supraclavicular incision   Vitals reviewed.        FINAL PATHOLOGIC DIAGNOSIS  RIGHT AXILLA NODULE, EXCISION:  - Invasive ductal carcinoma, grade 2, approximately 15 mm.  - Histologic Grade (Sal Histologic Score)  Glandular (Acinar/Tubular Differentiation: 2.  Nuclear Pleomorphism: 2.  Mitotic Rate: 3.  Overall Grade: Grade 2 (score 7).  - The carcinoma extends to the inked edge of the specimen.  Ancillary studies-  ER: Positive (weak to moderate, 71-80% of tumor cells).  GA: Positive (moderate to strong, 70% of tumor cells).  HER2: Positive (3+).  Ki-67: 50%.  All stains have satisfactory controls. Ki-67 was calculated manually.  Assessment:      doing well after modified radical mastectomy supraclavicular lymph node biopsy on the right.  The sutures removed.  Steri-Strips was placed.  The axillary drain was removed.  Plan:       Follow-up in 1 week and we will likely remove  the chest wall drain.  Follow up with Oncology, Dr. Munguia who she has already seen.    Radiation Oncology consultation to determine if radiation therapy would be part of the treatment plan for this patient          Should the patient be discharged from the skilled nursing facility old recommended home health to be arranged for drain care.  The drain needs to be emptied once daily.  The patient may shower as long as the dressing around the drain is changed after showering

## 2018-05-28 NOTE — PATIENT INSTRUCTIONS
We would like to see you back in 1 week to remove the remaining drain.    You will need to see you're medical oncologist as well as a radiation oncologist to determine whether radiation and/or chemotherapy, highly unlikely for chemotherapy, would be part of the treatment plan.    As far as the skilled nursing unit is concerned they should discharge you when you are ready for discharge from their point of view. I recommend that they arrange home health for drain care want to discharge.    If the skilled nursing facility does not arrange home health please call our office and we can arrange this

## 2018-05-29 ENCOUNTER — TELEPHONE (OUTPATIENT)
Dept: SURGERY | Facility: CLINIC | Age: 83
End: 2018-05-29

## 2018-05-29 NOTE — TELEPHONE ENCOUNTER
----- Message from Vijaya Warren sent at 5/29/2018 11:39 AM CDT -----  [phone]    Opal  would like to be called back regarding  Pt visit on 5/28/2018    can be reached at [phone]

## 2018-05-31 ENCOUNTER — OFFICE VISIT (OUTPATIENT)
Dept: HEMATOLOGY/ONCOLOGY | Facility: CLINIC | Age: 83
End: 2018-05-31
Payer: MEDICARE

## 2018-05-31 VITALS
WEIGHT: 105.38 LBS | TEMPERATURE: 100 F | SYSTOLIC BLOOD PRESSURE: 120 MMHG | OXYGEN SATURATION: 92 % | HEIGHT: 60 IN | DIASTOLIC BLOOD PRESSURE: 72 MMHG | HEART RATE: 84 BPM | BODY MASS INDEX: 20.69 KG/M2

## 2018-05-31 DIAGNOSIS — C50.911 RIGHT BREAST CANCER WITH T3 TUMOR, >5 CM IN GREATEST DIMENSION: Primary | ICD-10-CM

## 2018-05-31 PROCEDURE — 99499 UNLISTED E&M SERVICE: CPT | Mod: S$GLB,,, | Performed by: INTERNAL MEDICINE

## 2018-05-31 PROCEDURE — 99214 OFFICE O/P EST MOD 30 MIN: CPT | Mod: S$GLB,,, | Performed by: INTERNAL MEDICINE

## 2018-05-31 PROCEDURE — 99999 PR PBB SHADOW E&M-EST. PATIENT-LVL IV: CPT | Mod: PBBFAC,,, | Performed by: INTERNAL MEDICINE

## 2018-05-31 NOTE — PROGRESS NOTES
Subjective:       Patient ID: Martha Terrell is a 89 y.o. female.    Chief Complaint: Follow-up    HPI This is a 89-year-old  lady who  Comes for follow up of her locally advanced breast cancer,.  She recently   was seen in the General Surgery Department because of a  right breast mass.  She   was unable to tell how long she had it, but it had been at least four months.     Mammogram showed that the mass measured 5.0 x 2.4 x 3.4 cm.     On physical exam, she also had palpable axillary nodes on the right side.  The   patient has had ultrasound-guided biopsies and had been found to have a   triple-negative breast cancer (ER/NH negative and HER-2 negative by FISH, 2+ by   IHC).  The patient is a  who lives alone, has no relatives or friends.     She has had Ct scans that show a larhe breast mass and matted axilallary nodes. She has some non calcified pulmonary nodules which are all sub-centimeter and of ukknown significance     She has had surgery and besides the main tumor  she has a positive supraclavicular node and 17 positive axillary nodes.  She comes in accompanied by her neighbor who has power of   She comes to discus what to do going forward  MEDICATIONS:  Amlodipine, benazepril, and Dyazide.     MEDICATIONS:  See MedCard.     PREVIOUS SURGERIES:  Hysterectomy at age 43, tonsillectomy.     SOCIAL HISTORY:  She is a  who lives in [place].  She lives alone.    She says she has no friends or relatives.     She used to smoke for 25 years, averaging half a pack a day.  She denies   significant drinking.  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     FAMILY HISTORY:  Father  of colon cancer.  Mother had diabetes.  No heart   attacks.     PAST MEDICAL HISTORY:  1.  Locally advanced breast cancer on the right side.  2.  Urinary frequency.  3. Hx of tobacco use     Review of Systems   Constitutional: Negative.    HENT: Negative.    Eyes: Negative.    Respiratory:  Negative.  Negative for cough and wheezing.    Cardiovascular: Negative.  Negative for chest pain.   Gastrointestinal: Negative.    Genitourinary: Negative.    Neurological: Negative.    Psychiatric/Behavioral: Negative.        Objective:      Physical Exam   Constitutional: She is oriented to person, place, and time. She appears well-developed. No distress.   HENT:   Head: Normocephalic.   Right Ear: Tympanic membrane, external ear and ear canal normal.   Left Ear: Tympanic membrane, external ear and ear canal normal.   Nose: Nose normal. Right sinus exhibits no maxillary sinus tenderness and no frontal sinus tenderness. Left sinus exhibits no maxillary sinus tenderness and no frontal sinus tenderness.   Mouth/Throat: Oropharynx is clear and moist and mucous membranes are normal.   Teeth normal.  Gums normal.   Eyes: Conjunctivae and lids are normal. Pupils are equal, round, and reactive to light.   Neck: Normal carotid pulses, no hepatojugular reflux and no JVD present. Carotid bruit is not present. No tracheal deviation present. No thyroid mass and no thyromegaly present.   Cardiovascular: Normal rate, regular rhythm, S1 normal, S2 normal, normal heart sounds and intact distal pulses.  Exam reveals no gallop and no friction rub.    No murmur heard.  Carotid exam normal   Pulmonary/Chest: Effort normal and breath sounds normal. No accessory muscle usage. No respiratory distress. She has no wheezes. She has no rales. She exhibits no tenderness.   Abdominal: Soft. Normal appearance. She exhibits no distension and no mass. There is no splenomegaly or hepatomegaly. There is no tenderness. There is no rebound and no guarding.   Musculoskeletal: Normal range of motion. She exhibits no edema or tenderness.        Right hand: Normal.        Left hand: Normal.       Lymphadenopathy:     She has no cervical adenopathy.     She has no axillary adenopathy.        Right: No inguinal and no supraclavicular adenopathy present.         Left: No inguinal and no supraclavicular adenopathy present.   Neurological: She is alert and oriented to person, place, and time. She has normal strength. No cranial nerve deficit. Coordination normal.   Skin: Skin is warm and dry. No rash noted. She is not diaphoretic. No cyanosis or erythema. No pallor. Nails show no clubbing.   Psychiatric: She has a normal mood and affect. Her behavior is normal. Judgment and thought content normal.       Wt Readings from Last 3 Encounters:   05/31/18 47.8 kg (105 lb 6.1 oz)   05/28/18 48.1 kg (106 lb 0.7 oz)   05/28/18 48.1 kg (106 lb)     Temp Readings from Last 3 Encounters:   05/31/18 100 °F (37.8 °C) (Oral)   05/28/18 98.3 °F (36.8 °C) (Tympanic)   05/28/18 98 °F (36.7 °C)     BP Readings from Last 3 Encounters:   05/31/18 120/72   05/28/18 120/74   05/28/18 115/78     Pulse Readings from Last 3 Encounters:   05/31/18 84   05/28/18 76   05/28/18 76       Assessment:       1. Right breast cancer with T3 tumor, >5 cm in greatest dimension        Plan:       Patient and lady accompaning her,  were informed of the pathology report. She was told that I would like for her to see Radiation oncology and discus radiation therapy.  There is very high probability of both local and systemic relapse   . She is not a candidate for hormonal or chemotherapy.  I understand she 89 and there are social issues involved her presentation but hopefully our social service department can help.  See me in 4 weeks

## 2018-06-01 ENCOUNTER — TELEPHONE (OUTPATIENT)
Dept: HEMATOLOGY/ONCOLOGY | Facility: CLINIC | Age: 83
End: 2018-06-01

## 2018-06-01 NOTE — TELEPHONE ENCOUNTER
----- Message from Brittnee Fernández sent at 6/1/2018  2:32 PM CDT -----  Contact: pt french of   [facility] [facility]  Calling in regards to being worked into the schedule and please advise  [phone]

## 2018-06-04 ENCOUNTER — OFFICE VISIT (OUTPATIENT)
Dept: SURGERY | Facility: CLINIC | Age: 83
End: 2018-06-04
Payer: MEDICARE

## 2018-06-04 VITALS
SYSTOLIC BLOOD PRESSURE: 164 MMHG | HEART RATE: 85 BPM | BODY MASS INDEX: 20.51 KG/M2 | TEMPERATURE: 98 F | WEIGHT: 105 LBS | DIASTOLIC BLOOD PRESSURE: 89 MMHG

## 2018-06-04 DIAGNOSIS — C50.911 BREAST CANCER METASTASIZED TO AXILLARY LYMPH NODE, RIGHT: Primary | ICD-10-CM

## 2018-06-04 DIAGNOSIS — Z90.11 STATUS POST MASTECTOMY, RIGHT: ICD-10-CM

## 2018-06-04 DIAGNOSIS — C77.3 BREAST CANCER METASTASIZED TO AXILLARY LYMPH NODE, RIGHT: Primary | ICD-10-CM

## 2018-06-04 PROCEDURE — 99999 PR PBB SHADOW E&M-EST. PATIENT-LVL III: CPT | Mod: PBBFAC,,, | Performed by: SURGERY

## 2018-06-04 PROCEDURE — 99024 POSTOP FOLLOW-UP VISIT: CPT | Mod: S$GLB,,, | Performed by: SURGERY

## 2018-06-04 NOTE — PATIENT INSTRUCTIONS
The drain in year armpit is still having too much fluid from a.  It needs to be somewhere around 10-20 mL over 24 hr.  To prevent of fluid buildup of recurring after we removed it.    We will plan to see you back in 2 weeks

## 2018-06-04 NOTE — PROGRESS NOTES
Subjective:       Patient ID: Martha Terrell is a 89 y.o. female.  Right modified radical mastectomy        Chief Complaint: Follow-up    Patient is postoperative from a right modified radical mastectomy.  She had a total of 17 positive lymph nodes.  She has been seen by Oncology no chemotherapy is planned.  She is to be seen by Radiation Oncology.    Today she presents with a Ross-Hickman bulb with approximately 75 mL of srinivasan fluid and      Review of Systems   Respiratory: Negative.    Cardiovascular: Negative.    Gastrointestinal: Negative.    Skin: Negative.         Drain still in place       Objective:      Physical Exam   Constitutional:   Frail   Skin:   The right mastectomy site is clean.  There is a Ross-Hickman drain in the right axilla which approximately 75 mL of fluid   Vitals reviewed.      Assessment:      post modified radical mastectomy and supraclavicular lymph node biopsy.  Still with high amounts of drain output, approximately 75 mL in the last 24 hr  Plan:         Oncology note reviewed, agree with radiation oncology consultation  Leave the drain in place.  Follow up in 2 weeks

## 2018-06-07 ENCOUNTER — TELEPHONE (OUTPATIENT)
Dept: HEMATOLOGY/ONCOLOGY | Facility: CLINIC | Age: 83
End: 2018-06-07

## 2018-06-07 NOTE — TELEPHONE ENCOUNTER
SW returned call from Renetta Mason, pt's POA and neighbor. Ms. Jackson wanted to make sure SW knew that the pt had a consult with Radiation Oncology next week and asked that SW be present to help process options with pt afterward. Ms. Jackson explained that pt has been in rehab facility (now paying out-of-pocket). Rehab facility informed Renetta that pt could not live alone, so she is not sure what to do. Pt remains independent and her main goal is to be reunited with her dog. SW listened as pt's friend talked about the different options - HH, assisted living, private sitters, etc. Ms. Jackson's main concern is patient falling and being alone or her forgetting that she has food cooking. Ms. Jackson thinks that her and several other neighbors can check on pt during the day, but she feels they may need a sitter to watch her overnight in case pt needs assistance. BOB will plan to meet with pt and her POA, Ms. Jackson, when they come to clinic on Monday.

## 2018-06-08 NOTE — PROGRESS NOTES
[facility] - [place]    RADIATION ONCOLOGY NEW PATIENT CONSULTATION    Name: Martha Terrell  : 1921      Patient Referred To Radiation Oncology By:  Dr. Kingston Metcalf MD  [address] [place] [address]    DIAGNOSIS:  Right breast upper outer quadrant, stage IIIC: pT3 (>5cm) pN3c (SCV node positive on excision) cM0 (pulmonary nodules), triple negative, grade 3, extranodal extension, dermal lymphatic invasion.  May 15, 2018 right modified radical mastectomy and right supraclavicular lymph node excision.    HISTORY OF PRESENT ILLNESS:  Martha Terrell is a pleasant 89 y.o. female who had a diagnostic mammogram and ultrasound 2018 performed for a right breast mass and right breast pain.  On exam this measured 5 cm.  The mammogram showed a right upper outer quadrant breast mass.  There were inflammatory changes including skin thickening and diffusely increased breast density on the right.  The tumor involved the majority of the outer upper right breast.  There was a more focal area of increased density in the upper outer right breast.  Ultrasound showed a mass in the right breast 9 o'clock which was vascular, ill-defined and difficult to measure with approximate measurements 50 x 34 x 24 mm. In [facility] on 2018 she had biopsy of a right axillary mass returned metastatic carcinoma with a ribbon shaped clip placed and also biopsy of a right breast mass at 9 o'clock 1 cm from the nipple with a coil clip returning invasive carcinoma ductal type.  The invasive carcinoma.  High-grade.  Tumor was ER negative and MN negative with Her2 2+.   FISH testing was not amplified.  The ultrasound report mention multiple abnormal lymph nodes in the right axilla.  She was seen by Dr. Saldana of Medical Oncology ordered a CT for staging.  PET scan was not approved by her insurance.  In his note he did not think she was a chemotherapy candidate.  CT chest, abdomen and pelvis  with contrast was performed and I reviewed these images.  There was a large right breast mass and matted right axillary adenopathy.  There were bilateral subcentimeter pulmonary nodules noncalcified and COPD changes. She was scheduled to see radiation oncology canceled her appointment.  She did see Dr. Metcalf.    On May 15, 2018 she had a right modified radical mastectomy.  She had an incision over the palpable supraclavicular lymph nodes and lymph tissue was dissected in this area and sent as a separate specimen.  The breast tumor was a 5.1 cm invasive ductal carcinoma, grade 3.  The margins were clear with 11 mm closest deep margin.  There were 19 lymph nodes involved with carcinoma, the largest being 1.9 cm with extranodal extension.  There was lymphovascular invasion and dermal lymphatic invasion in the primary tumor.  The supraclavicular node contained a positive lymph node measuring 1.6 cm with extranodal extension.  She does have a caregiver.  She was discharged from the skilled nursing facility who recommended home health.  She did see Dr. Saldana after pathology was available and was recommended consideration of radiation but was not a candidate for hormonal therapy or chemotherapy.  She has a drain in place as of June 4th and is following with Dr. Metcalf to see him back in mid June.    REVIEW OF SYSTEMS: (Positive findings bold, otherwise negative)   Constitutional: fever, fatigue, weight change  Eyes: blurred vision in the past 3 months, denies double vision   ENT: ear pain, new mouth lesions, jaw pain, difficulty swallowing, sore throat  Cardiovascular: chest pain on exertion, reflux, extremity swelling  Respiratory: shortness of breath, dyspnea, cough, hemoptysis.   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 OTTO    SLT OS  4/25/13    TONSILLECTOMY      at age 6       ALLERGIES:   Review of patient's allergies indicates:   Allergen Reactions    Amlodipine      Other reaction(s): heart pounding    Benazepril      Other reaction(s): COUGH    Dyazide  [triamterene-hydrochlorothiazid]      Other reaction(s): Rash       MEDICATIONS:    Current Outpatient Prescriptions:     ascorbic acid (VITAMIN C) 500 MG tablet, Take 500 mg by mouth once daily., Disp: , Rfl:     aspirin (ECOTRIN) 81 MG EC tablet, Take 81 mg by mouth as needed for Pain., Disp: , Rfl:     brimonidine-timolol (COMBIGAN) 0.2-0.5 % Drop, Place 1 drop into both eyes 2 (two) times daily. PLEASE DISPENSE A 90 DAY SUPPLY, Disp: 5 mL, Rfl: 4    calcium-vitamin D tablet 600 mg-200 units, Take 1 tablet by mouth once daily., Disp: , Rfl:     fluticasone (FLONASE) 50 mcg/actuation nasal spray, 1 spray (50 mcg total) by Each Nare route once daily., Disp: 16 g, Rfl: 3    hydrocodone-acetaminophen (NORCO) 5-325 mg per tablet, Take 1 tablet by mouth every 6 (six) hours as needed., Disp: 20 tablet, Rfl: 0    latanoprost 0.005 % ophthalmic solution, Place 1 drop into both eyes  "every evening. PLEASE DISPENSE A 3 MONTH SUPPLY, Disp: 3 Bottle, Rfl: 4    lutein 6 mg Cap, Take 1 capsule by mouth once daily. , Disp: , Rfl:     montelukast (SINGULAIR) 10 mg tablet, Take 1 tablet (10 mg total) by mouth every evening., Disp: 30 tablet, Rfl: 0    sertraline (ZOLOFT) 50 MG tablet, TAKE ONE-HALF TO ONE TABLET BY MOUTH IN THE EVENING, Disp: 90 tablet, Rfl: 3    tolterodine (DETROL LA) 4 MG 24 hr capsule, TAKE ONE CAPSULE BY MOUTH ONCE DAILY, Disp: 30 capsule, Rfl: 9    VITAMIN B COMP W-C/ZINC (B COMPLEX-C-E-ZN) CpSR, Take 1 capsule by mouth., Disp: , Rfl:     vitamin E 400 UNIT capsule, Take 400 Units by mouth once daily., Disp: , Rfl:     neomycin-bacitracin-polymyxin (NEOSPORIN) ointment, Apply topically once daily., Disp: , Rfl: 0    SOCIAL HISTORY:  Social History     Social History    Marital status:      Spouse name: N/A    Number of children: N/A    Years of education: N/A     Occupational History    Not on file.     Social History Main Topics    Smoking status: Former Smoker     Packs/day: 0.25     Years: 30.00     Quit date: 1/27/1966    Smokeless tobacco: Never Used    Alcohol use No    Drug use: No    Sexual activity: No     Other Topics Concern    Not on file     Social History Narrative    Lives alone, 1 dog.       FAMILY HISTORY:  Family History   Problem Relation Age of Onset    Cancer Father     Amblyopia Neg Hx     Blindness Neg Hx     Cataracts Neg Hx     Diabetes Neg Hx     Glaucoma Neg Hx     Hypertension Neg Hx     Macular degeneration Neg Hx     Retinal detachment Neg Hx     Strabismus Neg Hx     Stroke Neg Hx     Thyroid disease Neg Hx            PHYSICAL EXAMINATION:  Constitutional: well appearing, no acute distress, KPS 80  Vitals:    BP (!) 141/85   Pulse 89   Temp 98.8 °F (37.1 °C)   Ht 5' 1" (1.549 m)   Wt 47.9 kg (105 lb 9.6 oz)   PF 95 L/min   BMI 19.95 kg/m²   Eyes: sclera anicteric  Neck: trachea midline  Lymphatic: no " cervical, supraclavicular or axillary adenopathy.  Right axillary incision healing well.  Right supraclavicular incision healing well.  Breast:  Right mastectomy incision well healing without drainage.  No nodules or malignant skin changes.  Drain in place in the lower right lateral chest wall with less than 1 cm erythema around the drain without drainage or tenderness around the drain insertion.  Left breast without masses, retraction or skin changes.  Cardiovascular: regular rate, no murmurs, no edema of the upper or lower extremities, radial pulse 2+  Respiratory: unlabored effort, clear to auscultation, no wheezes  Abdomen: soft, non-tender, no rigidity, no masses, no hepatomegaly  Rectal: good sphincter tone, non-tender, no blood  Spine: non-tender to percussion cervical, thoracic and lumbosacral spine    IMAGING AND LABORATORY FINDINGS: As per HPI; images reviewed personally.    ASSESSMENT:  Locally advanced breast cancer, multiple high-risk features for local failure including numerous matted lymph nodes, extranodal extension, advanced breast nodule stage, dermal lymphatic invasion, triple negative status and high-grade disease.  Not a candidate for endocrine or systemic therapy.    PLAN:  We reviewed the role of radiation therapy in the treatment of locally advanced breast cancer after modified radical mastectomy.  I explained that there is an extremely high risk of recurrence local and regionally which could be morbid and fatal in itself and also lead to distant metastatic disease.  I offered her radiation to reduce the risk of chest wall and regional camille failure.  She understands that this may impact her risk of distant metastasis by eradicating local disease that could seed distantly, however she does have a very high risk of distant failure even with a RAD occasion of the local regional disease.  Although she is and is advanced age, due to her high risk features she may fail in the potential radiation  field before a comorbid condition causes her death.    We discussed implications of a local regional failure including pain, bleeding, infection etc.  She has some transportation and logistical issues and the  was present today at the visit.  The patient, her family and the  think we can work through most of these issues including transportation.  The patient wishes to proceed with radiation.  I will treat her chest wall and regional lymph nodes to 50 Gy in 25 fractions.  I do recommend a mastectomy incision boost of 10 Gy in 5 fractions with electrons due to the high risk features of advanced tumor stage, and dermal lymphatic invasion with grade 3 disease.  Therefore, she will require 6 weeks of radiation.  Radiation planning will be performed this week and we begin on the 25th to allow her to be discharged from the care facility back home and also to workup the logistics with her family and social work.    At the same time as the radiation planning scan, I will order a CT of the neck with contrast to imaging the lymph nodes in the low neck which will require radiation.  This area was not fully imaged on her CT chest, and she did have a positive supraclavicular node resected.    We discussed the techniques, toxicities and indications of radiation and I answered the patient's questions to their apparent satisfaction.

## 2018-06-11 ENCOUNTER — OFFICE VISIT (OUTPATIENT)
Dept: RADIATION ONCOLOGY | Facility: CLINIC | Age: 83
End: 2018-06-11
Payer: MEDICARE

## 2018-06-11 ENCOUNTER — SOCIAL WORK (OUTPATIENT)
Dept: HEMATOLOGY/ONCOLOGY | Facility: CLINIC | Age: 83
End: 2018-06-11

## 2018-06-11 VITALS
HEIGHT: 61 IN | TEMPERATURE: 99 F | HEART RATE: 89 BPM | BODY MASS INDEX: 19.94 KG/M2 | DIASTOLIC BLOOD PRESSURE: 85 MMHG | SYSTOLIC BLOOD PRESSURE: 141 MMHG | WEIGHT: 105.63 LBS

## 2018-06-11 DIAGNOSIS — C50.911 RIGHT BREAST CANCER WITH T3 TUMOR, >5 CM IN GREATEST DIMENSION: Primary | ICD-10-CM

## 2018-06-11 PROCEDURE — 99499 UNLISTED E&M SERVICE: CPT | Mod: S$GLB,,, | Performed by: RADIOLOGY

## 2018-06-11 PROCEDURE — 99999 PR PBB SHADOW E&M-EST. PATIENT-LVL III: CPT | Mod: PBBFAC,,, | Performed by: RADIOLOGY

## 2018-06-11 PROCEDURE — 99205 OFFICE O/P NEW HI 60 MIN: CPT | Mod: S$GLB,,, | Performed by: RADIOLOGY

## 2018-06-11 NOTE — LETTER
June 11, 2018      Kingston Metcalf MD  [address]  [facility] [address]           [facility] Radiation Oncology  [id number] [place] [id number]  Phone: [phone]  Fax: [phone]          Patient: Martha Terrell   MR Number: [id number]   YOB: 1921   Date of Visit: 6/11/2018       Dear Dr. Kingston Metcalf:    Thank you for referring Martha Terrell to me for evaluation. Attached you will find relevant portions of my assessment and plan of care.    If you have questions, please do not hesitate to call me. I look forward to following Martha Terrell along with you.    Sincerely,    Ranjeet Moreno II, MD    Enclosure  CC:  No Recipients    If you would like to receive this communication electronically, please contact [email or web address] or [phone] to request more information on [employer] access.    For providers and/or their staff who would like to refer a patient to [facility], please contact us through our one-stop-shop provider referral line, [facility] , at [phone].    If you feel you have received this communication in error or would no longer like to receive these types of communications, please e-mail [email or web address]

## 2018-06-12 NOTE — PROGRESS NOTES
BOB met with pt and her POA and friend, Renetta Mason, today at Ms. Mason's request. Ms. Mason feels having an additional, outside, neutral support for pt would be beneficial. Ms. Mason explained that the Rehab facility, [facility], is not allowing pt to be discharged without pt going to assisted living or having sitter service sin pt's home. However, pt seems to be reporting to friend that she does not need either service. Ms. Mason has concerns about pt being left alone due to her memory loss and her recent surgery has left her weak.    SW initially met pt in [facility]. Pt did not recognize SW at first, but soon remembered we had met before. SW easily re-established rapport with pt and her friend in exam room prior to doctor entering. SW was asked to stay and listen in on visit as she will be assisting in coordinating transportation and other services as needed. Pt was reluctant to have radiation, but after hearing the doctor's rationale, she was very interested in completing treatment. After visit with MD, BOB discussed discharging panning from rehabilitation facility. SW presented the options and pt definitely wants to return home. She indicated that she feels weak in the legs at times, but overall she feels well enough to care for herself. Ms. Mason reminded her about her fall and she feels it best to have sitter and HME to prevent further injuries. Pt was wiling to look into a rollator and she warmed up to the idea of having sitters at least temporarily until she feels stronger.     Pt mentioned wanting to cook on her own. SW discussed with pt about her memory loss. Pt acknowledged she does not remember as well as she used to which is frustrating for her. SW validated pt's feelings and provided emotional support. Pt recognizes need for assistance, but she is having a hard time accepting it as she has always been a very independent person. She will work with her friend to get caregivers/sitter lined up for next week,  and then they will call SW for an update. SW reminded them about transportation assistance if needed. MsRuperto Marlon will let me know if she needs us to set this up or if the sitters will provide this service to pt. SW will f/u with pt next week.

## 2018-06-13 ENCOUNTER — HOSPITAL ENCOUNTER (OUTPATIENT)
Dept: RADIOLOGY | Facility: HOSPITAL | Age: 83
Discharge: HOME | End: 2018-06-13
Attending: RADIOLOGY
Payer: MEDICARE

## 2018-06-13 ENCOUNTER — HOSPITAL ENCOUNTER (OUTPATIENT)
Dept: RADIATION THERAPY | Facility: HOSPITAL | Age: 83
Discharge: HOME | End: 2018-06-13
Attending: RADIOLOGY
Payer: MEDICARE

## 2018-06-13 PROCEDURE — 77290 THER RAD SIMULAJ FIELD CPLX: CPT | Mod: 26,,, | Performed by: RADIOLOGY

## 2018-06-13 PROCEDURE — 77334 RADIATION TREATMENT AID(S): CPT | Mod: 26,,, | Performed by: RADIOLOGY

## 2018-06-13 PROCEDURE — 77334 RADIATION TREATMENT AID(S): CPT | Mod: TC

## 2018-06-13 PROCEDURE — 77014 HC CT GUIDANCE RADIATION THERAPY FLDS PLACEMENT: CPT | Mod: TC

## 2018-06-13 PROCEDURE — 77290 THER RAD SIMULAJ FIELD CPLX: CPT | Mod: TC

## 2018-06-13 PROCEDURE — 77263 THER RADIOLOGY TX PLNG CPLX: CPT | Mod: ,,, | Performed by: RADIOLOGY

## 2018-06-14 ENCOUNTER — TELEPHONE (OUTPATIENT)
Dept: HEMATOLOGY/ONCOLOGY | Facility: CLINIC | Age: 83
End: 2018-06-14

## 2018-06-14 NOTE — TELEPHONE ENCOUNTER
BOB attempted to return Ms. Marlon COOK's call today. BOB left vm asking her to call back at her convenience.

## 2018-06-18 ENCOUNTER — OFFICE VISIT (OUTPATIENT)
Dept: SURGERY | Facility: CLINIC | Age: 83
End: 2018-06-18
Payer: MEDICARE

## 2018-06-18 VITALS
BODY MASS INDEX: 19.99 KG/M2 | TEMPERATURE: 98 F | HEART RATE: 77 BPM | SYSTOLIC BLOOD PRESSURE: 132 MMHG | DIASTOLIC BLOOD PRESSURE: 78 MMHG | WEIGHT: 105.81 LBS

## 2018-06-18 DIAGNOSIS — C77.3 BREAST CANCER METASTASIZED TO AXILLARY LYMPH NODE, RIGHT: Primary | ICD-10-CM

## 2018-06-18 DIAGNOSIS — C50.911 BREAST CANCER METASTASIZED TO AXILLARY LYMPH NODE, RIGHT: Primary | ICD-10-CM

## 2018-06-18 PROCEDURE — 99024 POSTOP FOLLOW-UP VISIT: CPT | Mod: S$GLB,,, | Performed by: SURGERY

## 2018-06-18 PROCEDURE — 99499 UNLISTED E&M SERVICE: CPT | Mod: S$GLB,,, | Performed by: SURGERY

## 2018-06-18 PROCEDURE — 99999 PR PBB SHADOW E&M-EST. PATIENT-LVL IV: CPT | Mod: PBBFAC,,, | Performed by: SURGERY

## 2018-06-18 NOTE — PROGRESS NOTES
Subjective:       Patient ID: Martha Terrell is a 89 y.o. female.    Drain removal    Chief Complaint: Post-op Evaluation    Patient is status post modified radical mastectomy she had approximately 19 positive lymph nodes.  She presents now for drain removal.    There is little to no fluid in the drain.  It is not decompressed      Review of Systems   Skin: Negative.        Objective:      Physical Exam   Constitutional:   Elderly/frail   Skin:   The right mastectomy site is clean.  The drain is decompressed with no fluid in it.  The drain was removed with the drain intact. A dressing was placed   Vitals reviewed.      Assessment:      locally advanced breast cancer on the right  Plan:       Drain removed.    Radiation therapy per Radiation Oncology.    Follow up with surgery in a month

## 2018-06-19 ENCOUNTER — TELEPHONE (OUTPATIENT)
Dept: HEMATOLOGY/ONCOLOGY | Facility: CLINIC | Age: 83
End: 2018-06-19

## 2018-06-19 NOTE — TELEPHONE ENCOUNTER
BOB returned Ms. Renetta Mason's (POA) call regarding postponing radiation therapy for Monday. Ms. Mason feels that putting pt through 5-6 weeks of daily radiation therapy is too much given her age and memory loss. Ms. Mason indicated they went to see the surgeon yesterday who mentioned pt still have cancer in lymph nodes and she did not recall this info (although Dr. Moreno in Radiation had mentioned this to her at least twice already). SW provided emotional support as we discussed pros and cons (with SW bringing up the probable painful recurrence of pt's cancer should she not have this tx and the pt reporting she wanted to proceed with xrt). Ms. Mason acknowledges pt's desire, but she also feels pt does not understand the potential side effects and that this (radiation) does not guarantee to prevent recurrence. Ms. Mason asked SW to contact Rad/Onc to postpone start date and also for SW to be present when they meet Dr. Munguia next Monday. SW will plan to be there in support of pt.     SW went to Dr. Moreno and Cleopatra about POA calling to postpone treatment start date. Dr. Moreno does not feel this is a good idea and will call POA to address her concerns.

## 2018-06-20 ENCOUNTER — DOCUMENTATION ONLY (OUTPATIENT)
Dept: RADIATION ONCOLOGY | Facility: CLINIC | Age: 83
End: 2018-06-20

## 2018-06-20 NOTE — PROGRESS NOTES
I had a long discussion yesterday with the patient's power-of-.  She is concerned that she will have to explain to the patient the reason for radiation every day during the 5-6 weeks of radiation due to the patient's poor memory.  I did explain that we can assist with this by having the therapists, my nurse and myself as well as social work explain as often as necessary.  Indeed, I met with the patient and her power of  date after our initial consultation and went over all of the information again to the patient's satisfaction.  She expressed understanding desired to proceed radiation.  The power-of- would like to delay radiation for 2 weeks because she will be going out of town and would like to be present when the patient starts radiation.  I again emphasized the need for radiation for local control.

## 2018-06-25 ENCOUNTER — OFFICE VISIT (OUTPATIENT)
Dept: HEMATOLOGY/ONCOLOGY | Facility: CLINIC | Age: 83
End: 2018-06-25
Payer: MEDICARE

## 2018-06-25 ENCOUNTER — TELEPHONE (OUTPATIENT)
Dept: RADIATION ONCOLOGY | Facility: CLINIC | Age: 83
End: 2018-06-25

## 2018-06-25 ENCOUNTER — SOCIAL WORK (OUTPATIENT)
Dept: HEMATOLOGY/ONCOLOGY | Facility: CLINIC | Age: 83
End: 2018-06-25

## 2018-06-25 VITALS
DIASTOLIC BLOOD PRESSURE: 70 MMHG | OXYGEN SATURATION: 96 % | TEMPERATURE: 98 F | BODY MASS INDEX: 19.52 KG/M2 | WEIGHT: 103.38 LBS | RESPIRATION RATE: 18 BRPM | SYSTOLIC BLOOD PRESSURE: 132 MMHG | HEART RATE: 90 BPM | HEIGHT: 61 IN

## 2018-06-25 DIAGNOSIS — C50.911 RIGHT BREAST CANCER WITH T3 TUMOR, >5 CM IN GREATEST DIMENSION: Primary | ICD-10-CM

## 2018-06-25 PROCEDURE — 99999 PR PBB SHADOW E&M-EST. PATIENT-LVL IV: CPT | Mod: PBBFAC,,, | Performed by: INTERNAL MEDICINE

## 2018-06-25 PROCEDURE — 99499 UNLISTED E&M SERVICE: CPT | Mod: S$GLB,,, | Performed by: INTERNAL MEDICINE

## 2018-06-25 PROCEDURE — 99214 OFFICE O/P EST MOD 30 MIN: CPT | Mod: S$GLB,,, | Performed by: INTERNAL MEDICINE

## 2018-06-25 NOTE — PROGRESS NOTES
Subjective:       Patient ID: Martha Terrell is a 89 y.o. female.    Chief Complaint: Follow-up    HPI This is a 89-year-old  lady who  Comes for follow up of her locally advanced breast cancer,.  She recently   was seen in the General Surgery Department because of a  right breast mass.  She   was unable to tell how long she had it, but it had been at least four months.     Mammogram showed that the mass measured 5.0 x 2.4 x 3.4 cm.     On physical exam, she also had palpable axillary nodes on the right side.  The   patient has had ultrasound-guided biopsies and had been found to have a   triple-negative breast cancer (ER/LA negative and HER-2 negative by FISH, 2+ by   IHC).  The patient is a  who lives alone, has no relatives or friends.     She has had Ct scans that show a larhe breast mass and matted axilallary nodes. She has some non calcified pulmonary nodules which are all sub-centimeter and of ukknown significance     She has had surgery and besides the main tumor  she has a positive supraclavicular node and 17 positive axillary nodes.  She comes in accompanied by her neighbor who has power of      She is due to start radiation therapy within the next 2 weeks  MEDICATIONS:  Amlodipine, benazepril, and Dyazide.     MEDICATIONS:  See MedCard.     PREVIOUS SURGERIES:  Hysterectomy at age 43, tonsillectomy.     SOCIAL HISTORY:  She is a  who lives in [place].  She lives alone.    She says she has no friends or relatives.     She used to smoke for 25 years, averaging half a pack a day.  She denies   significant drinking.  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     FAMILY HISTORY:  Father  of colon cancer.  Mother had diabetes.  No heart   attacks.     PAST MEDICAL HISTORY:  1.  Locally advanced breast cancer on the right side.  2.  Urinary frequency.  3. Hx of tobacco use     Review of Systems   Constitutional: Negative.    HENT: Negative.    Eyes: Negative.     Respiratory: Negative.  Negative for cough and wheezing.    Cardiovascular: Negative.  Negative for chest pain.   Gastrointestinal: Negative.    Genitourinary: Negative.    Neurological: Negative.    Psychiatric/Behavioral: Negative.        Objective:      Physical Exam   Constitutional: She is oriented to person, place, and time. She appears well-developed. No distress.   HENT:   Head: Normocephalic.   Right Ear: Tympanic membrane, external ear and ear canal normal.   Left Ear: Tympanic membrane, external ear and ear canal normal.   Nose: Nose normal. Right sinus exhibits no maxillary sinus tenderness and no frontal sinus tenderness. Left sinus exhibits no maxillary sinus tenderness and no frontal sinus tenderness.   Mouth/Throat: Oropharynx is clear and moist and mucous membranes are normal.   Teeth normal.  Gums normal.   Eyes: Conjunctivae and lids are normal. Pupils are equal, round, and reactive to light.   Neck: Normal carotid pulses, no hepatojugular reflux and no JVD present. Carotid bruit is not present. No tracheal deviation present. No thyroid mass and no thyromegaly present.   Cardiovascular: Normal rate, regular rhythm, S1 normal, S2 normal, normal heart sounds and intact distal pulses.  Exam reveals no gallop and no friction rub.    No murmur heard.  Carotid exam normal   Pulmonary/Chest: Effort normal and breath sounds normal. No accessory muscle usage. No respiratory distress. She has no wheezes. She has no rales. She exhibits no tenderness.   Right chest wall shows a well healed scar   Abdominal: Soft. Normal appearance. She exhibits no distension and no mass. There is no splenomegaly or hepatomegaly. There is no tenderness. There is no rebound and no guarding.   Musculoskeletal: Normal range of motion. She exhibits no edema or tenderness.        Right hand: Normal.        Left hand: Normal.       Lymphadenopathy:     She has no cervical adenopathy.     She has no axillary adenopathy.         Right: No inguinal and no supraclavicular adenopathy present.        Left: No inguinal and no supraclavicular adenopathy present.   Neurological: She is alert and oriented to person, place, and time. She has normal strength. No cranial nerve deficit. Coordination normal.   Skin: Skin is warm and dry. No rash noted. She is not diaphoretic. No cyanosis or erythema. No pallor. Nails show no clubbing.   Psychiatric: She has a normal mood and affect. Her behavior is normal. Judgment and thought content normal.     .  Wt Readings from Last 3 Encounters:   06/25/18 46.9 kg (103 lb 6.3 oz)   06/18/18 48 kg (105 lb 13.1 oz)   06/11/18 47.9 kg (105 lb 9.6 oz)     Temp Readings from Last 3 Encounters:   06/25/18 98.1 °F (36.7 °C) (Oral)   06/18/18 97.9 °F (36.6 °C) (Oral)   06/11/18 98.8 °F (37.1 °C)     BP Readings from Last 3 Encounters:   06/25/18 132/70   06/18/18 132/78   06/11/18 (!) 141/85     Pulse Readings from Last 3 Encounters:   06/25/18 90   06/18/18 77   06/11/18 89       Assessment:       1. Right breast cancer with T3 tumor, >5 cm in greatest dimension        Plan:       Patietn and caretaker with power of  tell me she has decided to have radiation therapy.  She was asked to go ahead with it as planned and see our NP in 3 months with a cbc and a cmp     She remains at high risk for local and systemic relapse

## 2018-06-25 NOTE — TELEPHONE ENCOUNTER
Made call to inform patient that radiation start date is July 9th @ 11:45 am. Alberto MELENDREZ) verbalized understanding.

## 2018-06-26 ENCOUNTER — PATIENT MESSAGE (OUTPATIENT)
Dept: INTERNAL MEDICINE | Facility: CLINIC | Age: 83
End: 2018-06-26

## 2018-06-26 ENCOUNTER — OFFICE VISIT (OUTPATIENT)
Dept: INTERNAL MEDICINE | Facility: CLINIC | Age: 83
End: 2018-06-26
Payer: MEDICARE

## 2018-06-26 ENCOUNTER — TELEPHONE (OUTPATIENT)
Dept: INTERNAL MEDICINE | Facility: CLINIC | Age: 83
End: 2018-06-26

## 2018-06-26 DIAGNOSIS — H40.1132 PRIMARY OPEN ANGLE GLAUCOMA OF BOTH EYES, MODERATE STAGE: ICD-10-CM

## 2018-06-26 DIAGNOSIS — F02.818 LATE ONSET ALZHEIMER'S DISEASE WITH BEHAVIORAL DISTURBANCE: ICD-10-CM

## 2018-06-26 DIAGNOSIS — F41.8 MIXED ANXIETY AND DEPRESSIVE DISORDER: ICD-10-CM

## 2018-06-26 DIAGNOSIS — R52 PAIN: ICD-10-CM

## 2018-06-26 DIAGNOSIS — C50.919 METASTATIC BREAST CANCER: Primary | ICD-10-CM

## 2018-06-26 DIAGNOSIS — H40.9 GLAUCOMA, UNSPECIFIED GLAUCOMA TYPE, UNSPECIFIED LATERALITY: ICD-10-CM

## 2018-06-26 DIAGNOSIS — G30.1 LATE ONSET ALZHEIMER'S DISEASE WITH BEHAVIORAL DISTURBANCE: ICD-10-CM

## 2018-06-26 DIAGNOSIS — R45.1 AGITATION: ICD-10-CM

## 2018-06-26 PROCEDURE — 99214 OFFICE O/P EST MOD 30 MIN: CPT | Mod: S$GLB,,, | Performed by: FAMILY MEDICINE

## 2018-06-26 PROCEDURE — 99499 UNLISTED E&M SERVICE: CPT | Mod: S$GLB,,, | Performed by: FAMILY MEDICINE

## 2018-06-26 PROCEDURE — 99999 PR PBB SHADOW E&M-EST. PATIENT-LVL II: CPT | Mod: PBBFAC,,, | Performed by: FAMILY MEDICINE

## 2018-06-26 RX ORDER — BRIMONIDINE TARTRATE AND TIMOLOL MALEATE 2; 5 MG/ML; MG/ML
1 SOLUTION OPHTHALMIC 2 TIMES DAILY
Qty: 5 ML | Refills: 4 | Status: SHIPPED | OUTPATIENT
Start: 2018-06-26

## 2018-06-26 RX ORDER — HYDROCODONE BITARTRATE AND ACETAMINOPHEN 5; 325 MG/1; MG/1
1 TABLET ORAL EVERY 6 HOURS PRN
Qty: 30 TABLET | Refills: 0 | Status: SHIPPED | OUTPATIENT
Start: 2018-06-26

## 2018-06-26 RX ORDER — HALOPERIDOL 0.5 MG/1
0.5 TABLET ORAL EVERY 6 HOURS PRN
Qty: 30 TABLET | Refills: 0 | Status: SHIPPED | OUTPATIENT
Start: 2018-06-26 | End: 2019-06-26

## 2018-06-26 RX ORDER — SERTRALINE HYDROCHLORIDE 50 MG/1
TABLET, FILM COATED ORAL
Qty: 90 TABLET | Refills: 3 | Status: SHIPPED | OUTPATIENT
Start: 2018-06-26

## 2018-06-26 RX ORDER — HYDROCODONE BITARTRATE AND ACETAMINOPHEN 5; 325 MG/1; MG/1
1 TABLET ORAL EVERY 6 HOURS PRN
Qty: 30 TABLET | Refills: 0 | Status: SHIPPED | OUTPATIENT
Start: 2018-06-26 | End: 2018-06-26 | Stop reason: SDUPTHER

## 2018-06-26 RX ORDER — LATANOPROST 50 UG/ML
1 SOLUTION/ DROPS OPHTHALMIC NIGHTLY
Qty: 3 BOTTLE | Refills: 4 | Status: SHIPPED | OUTPATIENT
Start: 2018-06-26

## 2018-06-26 NOTE — TELEPHONE ENCOUNTER
----- Message from Era Sauer sent at 6/26/2018  4:09 PM CDT -----  Contact: Pt  Please give pharmacy a call at: regarding if the quantity can be changed.         [address] [facility]  [address] [facility] [address]  Phone: [phone] Fax: [phone]

## 2018-06-26 NOTE — PROGRESS NOTES
BOB met with pt and her POA, Renetta Mason, after her visit with her doctor to f/u. Pt recently returned home after being in a rehab facility for a few weeks. POA explained the adjustment has not been going very well. Pt explained she does not understand why she has people living in her apartment. SW provided pt with emotional support and gently explained that her rehab facility required pt be discharged either with sitters at home or placement at an assisted living facility. Her POA expressed she felt it was pt's best interest to return home for the environmental stability and to be with her dog, Sake. POA informed SW that pt had called the police once on her sitter (as pt wanted her to leave her apt). POA expressed her concern for pt's safety and also her legal responsibility for taking care of pt. POA offered to find pt an assisted living facility that might allow pets as this seems like best compromise for pt and POA. POA explained she will be out of town for vacation, but once she returns, she will begin looking for placement (which pt agreed to tour with POA). Pt will also begin radiation therapy around this same time. BOB explained several times (on multiple occasions to pt and POA) that SW can set-up transportation through an internal funding sources if needed. They do not feel this will be needed as her sitters will plan to bring her. If pt decides to move into a facility, they will inform SW in order to revisit transportation needs. SW will cont to f/u as needed/requested.

## 2018-06-26 NOTE — PATIENT INSTRUCTIONS
For Caregivers: Daily Care for Dementia Patients     Gardening can be a pleasant way to keep your loved one active.   Over time, people with dementia will need more and more help with daily tasks. These include eating meals, taking medicines, and getting enough exercise. They also include personal care needs, such as bathing and dressing. To reduce stress, make these activities part of a routine. Ask family and friends to lend a hand. And be aware that your loved ones abilities can change from day to day. If you have problems meeting your loved ones needs, its time to get help. Talk to a  or local support agency--such as a local Alzheimers Association chapter.   Activity and exercise  Regular activity is good for your loved ones body and mind. It may even help slow the progression of the disease. Keep to your loved ones old routines when possible. It also helps to:  · Do things together. Go for a walk, garden, or bake a cake. Basic, repetitive activities are good choices.  · Be active as often as possible. This releases pent-up energy, which can reduce restlessness and improve sleep.  · Include social activities. Take your loved one to see friends and family. But try to keep things simple. Loud noises, crowds, or too many people talking at once can be upsetting.  Taking medicines  Be sure all prescribed medicines are taken as directed. These tips can help:  · Provide supervision if your loved one cannot safely take medicines alone.  · Set a routine so medicines are taken at the same time each day.  · Ensure that ALL medicines are taken. A pillbox can help you keep track.  · Plan ahead. Be sure to refill prescriptions before they run out.  Eating meals  At mealtime, serve healthy foods with plenty of fluids. These tips can also help:  · Keep meals simple. Too many choices can be overwhelming. Try to maintain a calm, quiet atmosphere while you eat.  · Place healthy snacks, such as fresh fruit, out  where they can be seen.  · Watch eating habits. People with dementia may eat too little or too much. Talk to the healthcare provider if you have concerns.  · Try finger foods if regular meals become too difficult for your loved one to eat.  Dressing  People with dementia may have trouble choosing what to wear. Its OK if clothes dont always match. But if help is needed:  · Choose clothing that is easy to put on and take off. Use Velcro shoes or slippers.  · Lay out a fresh outfit each day. Place clothes in the order they should be put on.  · If more help is needed, hand over clothing items one at a time. Explain how each item should be put on.  · Put dirty clothes away so theyre not worn again.  Bathing and grooming  Getting your loved one to bathe can be a real challenge. Try these tips:  · Treat bathing as a routine activity. But be flexible. A daily bath is probably unrealistic.  · Prepare bath items ahead of time, and be sure to test the water temperature.  · Avoid leaving your loved one alone in the bath or shower.             · Try visiting a barbershop or [employer] salon for help with hair washing, hair styling, and shaving.  Using the toilet  In later stages, dementia patients may develop incontinence (trouble controlling the bladder or bowel). To ease problems:  · Set a routine for using the toilet (for example, every 3 hours) and stick to it.  · Limit beverages before bedtime to prevent accidents. A bedside commode may also help.  · Be understanding if accidents happen. Your loved one may be as upset as you.  · At one point it may be necessary to have them wear an adult diaper.    · Talk to a healthcare provider if incontinence develops suddenly. It may signal other health issues that can be treated.  When to call the healthcare provider  Call the healthcare provider if you notice a sudden change in your loved ones behavior or emotions. These changes may be due to dementia. But they could also signal other  health problems that can be treated.   Date Last Reviewed: 10/2/2015  NOTE:This sheet is a summary. It may not cover all possible information. If you have questions about this medicine, talk to your doctor, pharmacist, or health care provider. Copyright© 2017 [employer]        For Caregivers: Improving Communication with Dementia Patients  Dementia makes it harder for your loved one to understand and be understood. This can be troubling for both of you. Remember, these problems are not your loved ones fault. Theyre due to the disease. These tips can help you find ways to cope.    Keep it simple  The best way to talk to your loved one is using simple words and short sentences. Be sure you have his or her attention. Stick to one subject at a time. And use a gentle, positive tone of voice. Timing is also important. Giving information for activities that are hours away may be a waste of time and energy.  Instead of saying: Its cold outside, so be sure to put on your coat and hat.  Try: Please put on your coat. Now put on your hat.   Be patient  People with dementia often lose their short-term memory. This means they may ask the same question over and over. Although it can be frustrating, do your best to remain calm. Try changing the subject or getting your loved one to focus on a new task. Or, try to understand why the question is being asked. For example, your loved one may worry about missing an appointment or being left behind.  Instead of saying: Michelle already told you--the appointment is at 2 oclock!  Try: Dont worry. Im going with you.   Use distraction  Your loved one may try to do things that are inappropriate or unsafe. At these times, a good tactic is using distraction. Try getting your loved one to focus on something new. Your loved one may then forget what he or she was doing in the first place.  Instead of saying: What are you doing? You cant go out now!  Try: Would you help me with  dinner?   Try statements, not questions  Your loved one may not want to do certain activities. Instead of arguing, phrase requests as statements rather than questions. Using visual cues, such as holding a towel when its time for a bath, can also help.  Instead of saying: Do you want to take your bath now?  Try: Its time for your bath. Let me help you get ready.   Avoid arguing about reality  People with dementia may not be able to separate past from present. If this happens, dont insist on your version of reality. It may be best to change the subject or play along to avoid added stress. Refrain from using harsh words or angry gestures. Your loved one may not understand you. But he or she can still be upset by your emotional cues. And if your loved one becomes very upset, stay calm. Try to redirect his or her attention to another activity.  Instead of saying: You cant call your father. Hes been dead for years!  Try: Lets look at some pictures of him.   Coping with changes in behavior and personality  People with dementia may have moments when they seem perfectly normal. At other times, they may act suspicious, angry, or afraid. They can also become agitated, or see things that arent there. If this happens, try to be understanding. For them, the world can be a very stressful place. However, if these changes are sudden, severe, or create safety issues, talk to a doctor. You should also mention any signs of depression. These include withdrawal, loss of interest in activities or food, extreme tiredness, and crying.  Date Last Reviewed: 7/1/2016 © 2000-2017 [employer]. [address] [place] [address], [place] [address]. All rights reserved. This information is not intended as a substitute for professional medical care. Always follow your healthcare professional's instructions.        For Caregivers: Safety Tips for Dementia Patients  The symptoms of dementia can sometimes lead to unsafe  situations. Areas of special concern include driving and wandering away from home. You may also need to make changes in your loved ones living space. These can help protect your loved one even when you arent around.  Discourage driving  Driving is often not safe for a person with dementia. It may even be illegal. Ask the healthcare provider whether its safe for your loved one to drive. If safety is in question, use these tips to prevent him or her from getting behind the wheel:  · Limit access to the car. Keep the keys with you or lock them away.  · Ask an authority figure, such as a healthcare provider or , to tell your loved one not to drive.  · Ask your healthcare provider about contacting the Department of Motor Vehicles.  Watch for wandering  People with dementia may wander away from the house and get lost. To keep your loved one safer, try these tips:  · Have your loved one wear an ID bracelet at all times. You can also enroll your loved one in the Alzheimers Associations Safe Return program.  · Install door chimes so you know when exterior doors are being opened.  · Ask neighbors to call you if they see your loved one out alone.  · Go with your loved one if he or she insists on leaving the house. Dont argue or yell. Instead, use distraction or gentle hints to get him or her to return home.  · People with dementia often have a reversed sleep-wake cycle, meaning that they can be up all night and wander away when you least expect it.    Make living spaces safe  Keep your loved one safe by simplifying his or her living space. This means reducing clutter and removing hazards. You may also want to get advice from an occupational therapist (home safety expert). Keep in mind that some changes may not be needed right away. Focus on major safety concerns first.  In living spaces  Suggested safety steps include:  · Install smoke alarms and nightlights.  · Display emergency numbers and the home  address near all phones.  · Install an answering machine so important messages arent missed.  · Reduce tripping hazards. Move electrical and phone cords out of the way. Place colored tape on the edges of steps.  In the bathroom  Suggested safety steps include:  · Store hair dryers, razors, and curling irons in a secure area.  · Remove poisons, such as  and nail polish remover.  · Keep medicines in a secure area, not in the medicine cabinet.  · Remove inside door locks so your loved one doesnt get locked inside.  In the kitchen  Suggested safety steps include:  · Unplug toasters and other appliances when not in use.  · Limit access to alcoholic beverages. These can make symptoms much worse.  · Remove or cover knobs on stoves and other appliances.  · Check food for spoilage. Your loved one may not know when food has gone bad.  Other areas of the home  Suggested safety steps include:  · Lock up hazardous substances, such as bleach, pesticides, and paint thinners.  · Keep pool or hot tub areas closed off.  · Set the hot water heater below 120°F (48.8°C).  · Keep a spare key outside the house in case your loved one locks you out.  Prevent fraud  People with dementia may be easy prey for dishonest salespeople or money scams. Try placing a No Solicitations sign on your loved ones front door. Add his or her phone number to the [employer]s Do Not Call list ([phone]). You should also limit access to credit cards and cash.  Can my loved one live alone?  Early on, people with dementia can often handle daily tasks with little or no help. At some point, though, it will no longer be safe for them to be on their own. The timing for this is different for each person. But problems such as forgetting to eat, bathe, or take medicines can all be signs that more supervision is needed. If you have concerns, be sure to talk with your loved ones healthcare provider.   Date Last Reviewed: 10/2/2015  ©  9523-2496 [employer]. [address] [place] [address], [place] [address]. All rights reserved. This information is not intended as a substitute for professional medical care. Always follow your healthcare professional's instructions.

## 2018-06-26 NOTE — PROGRESS NOTES
Subjective:       Patient ID: Martha Terrell is a 89 y.o. female.    Chief Complaint: Discuss care    Representative (healthcare POA) of 89 yo female with recent diagnosis of met breast cancer treated with surgery, radiation therapy proposed and scheduled but has been delayed here to discuss care. Evidently Ms. Terrell refused to come to her appointment today. Evidently since her surgery she has had worsening confusion, particularly in the evenings. She was recently discharged from skilled nursing post-op to her apartment with 24 hour sitters; she has been very confused about the sitters, called the police because they wouldn't leave, and has been increasingly confused and uncooperative. Her only caregiver and her POA here today is her friend and neighbor who is concerned about best course of action. She states that Ms. Terrell does not understand that she has cancer much of the time. She evidently expressed a desire to have radiation but then does not seem to understand the goals, risks or benefits of the treatment. She is reluctant to leave home for appointments. Her POA does not believe that she will tolerate the scheduled treatments and has thus postponed XRT. She is also concerned about whether or not she should try to move Ms. Terrell into assisted living. Ms. Terrell is  and has no living family members. Her main goal for her life is to be home with her dog. We discussed hospice services which has evidently been brought up by her  already.  Goals of care from this point onward are comfort and to remain at home if possible. We discussed respite service available with hospice which may be necessary if a transition from home to assisted living or nursing home is needed. We also discussed inpatient hospice facilities in case symptom management became a concern. At this point, POA requests hospice referral. She also requests letter for the patient stating that 24 hour sitter care is necessary, since  this has been a point of contention. We discussed various hospice companies, including my affiliation with [facility]. She currently has home health through [facility]--I suggested this would make an easy transition for her. However POA elects [facility] and requests that I follow the patient through end of life. Referral order entered.     I spent 40 minutes in face to face time with >50% counseling on plan of care and resources available.       HPI    Review of Systems    Physical Exam    Assessment:       1. Metastatic breast cancer    2. Agitation    3. Late onset Alzheimer's disease with behavioral disturbance    4. Pain    5. Primary open angle glaucoma of both eyes, moderate stage    6. Mixed anxiety and depressive disorder    7. Glaucoma, unspecified glaucoma type, unspecified laterality        Plan:           Problem List Items Addressed This Visit     Primary open angle glaucoma of both eyes, moderate stage    Relevant Medications    latanoprost 0.005 % ophthalmic solution    brimonidine-timolol (COMBIGAN) 0.2-0.5 % Drop    Mixed anxiety and depressive disorder    Relevant Medications    sertraline (ZOLOFT) 50 MG tablet    Late onset Alzheimer's disease with behavioral disturbance    Relevant Medications    sertraline (ZOLOFT) 50 MG tablet    Metastatic breast cancer - Primary    Relevant Orders    Ambulatory referral to Hospice    Agitation    Relevant Medications    haloperidol (HALDOL) 0.5 MG tablet    Pain    Relevant Medications    HYDROcodone-acetaminophen (NORCO) 5-325 mg per tablet    Glaucoma    Relevant Medications    latanoprost 0.005 % ophthalmic solution    brimonidine-timolol (COMBIGAN) 0.2-0.5 % Drop

## 2018-07-05 ENCOUNTER — TELEPHONE (OUTPATIENT)
Dept: RADIATION ONCOLOGY | Facility: CLINIC | Age: 83
End: 2018-07-05

## 2018-07-05 NOTE — TELEPHONE ENCOUNTER
Made call to remind pt about radiation start date 7-9-18. No answer, left message and call back number.

## 2018-07-05 NOTE — TELEPHONE ENCOUNTER
Mrs Dominguez (POA) returned phone call and stated patient will not be doing radiation treatment but will do hospice instead. Informed pt if they should change their mind about treatment please call back. Best regards given and pt verbalized understanding.

## 2019-03-28 ENCOUNTER — PES CALL (OUTPATIENT)
Dept: ADMINISTRATIVE | Facility: CLINIC | Age: 84
End: 2019-03-28

## 2019-08-28 ENCOUNTER — PATIENT MESSAGE (OUTPATIENT)
Dept: FAMILY MEDICINE | Facility: CLINIC | Age: 84
End: 2019-08-28

## 2020-11-20 NOTE — TELEPHONE ENCOUNTER
LMOM advising pt that it was not Dr. Lugo's office trying to reach her./rpr   Spoke with patient in follow up call to urgent care visit, aware covid positive.   No further questions or concerns.

## 2022-02-08 NOTE — MR AVS SNAPSHOT
[facility]  [address]  [place] [address]  Phone: [phone]  Fax: [phone]                  Martha Terrell   2017 1:00 PM   Office Visit    Description:  Female : 1921   Provider:  Mervin Purcell MD   Department:  [facility]           Diagnoses this Visit        Comments    Allergic rhinitis due to other allergen    -  Primary     Cough                To Do List           Future Appointments        Provider Department Dept Phone    2017 11:00 AM Glenn Payan MD [facility] - Ophthalmology [phone]    2017 11:15 AM Glenn Payan MD [facility] Ophthalmology [phone]    2017 10:20 AM Nichelle Lugo MD [facility] - Internal Medicine [phone]      Goals (5 Years of Data)     None       These Medications        Disp Refills Start End    montelukast (SINGULAIR) 10 mg tablet 30 tablet 1 2017    Take 1 tablet (10 mg total) by mouth once daily. - Oral    Pharmacy: [address] Ph #: [phone]         [facility]     [facility] On [facility] Nurse Care Line -  Assistance  Unless otherwise directed by your provider, please contact [facility], our nurse care line that is available for / assistance.     Registered nurses in the [facility] provide: appointment scheduling, clinical advisement, health education, and other advisory services.  Call: [phone] (toll free)               Medications           Message regarding Medications     Verify the changes and/or additions to your medication regime listed below are the same as discussed with your clinician today.  If any of these changes or additions are incorrect, please notify your healthcare provider.        START taking these NEW medications        Refills    montelukast (SINGULAIR) 10 mg tablet 1    Sig: Take 1 tablet (10 mg total) by mouth once daily.    Class:  Patient had to miss appointment for Accutane due to family member being ill, available after 330. Okay to schedule to MD add?   "Normal    Route: Oral      STOP taking these medications     multivitamin capsule Take 1 capsule by mouth once daily.    vit C-vit E-copper-ZnOx-lutein 226-200-5-0.8 mg-unit-mg-mg Cap Take 1 capsule by mouth once daily.           Verify that the below list of medications is an accurate representation of the medications you are currently taking.  If none reported, the list may be blank. If incorrect, please contact your healthcare provider. Carry this list with you in case of emergency.           Current Medications     ascorbic acid (VITAMIN C) 500 MG tablet Take 500 mg by mouth once daily.    brimonidine-timolol (COMBIGAN) 0.2-0.5 % Drop instill 1 drop into each eye twice daily    calcium-vitamin D tablet 600 mg-200 units Take 1 tablet by mouth once daily.    latanoprost 0.005 % ophthalmic solution INSTILL 1 DROP INTO EACH EYE IN THE EVENING    lutein 6 mg Cap Take by mouth.    meclizine (ANTIVERT) 12.5 mg tablet Take 1 tablet (12.5 mg total) by mouth 3 (three) times daily as needed.    sertraline (ZOLOFT) 50 MG tablet Take 1.5 tablets (75 mg total) by mouth every evening.    tolterodine (DETROL LA) 4 MG 24 hr capsule TAKE ONE CAPSULE BY MOUTH ONE TIME DAILY     VITAMIN B COMP W-C/ZINC (B COMPLEX-C-E-ZN) CpSR Take 1 capsule by mouth.    vitamin E 400 UNIT capsule Take 400 Units by mouth once daily.    hydrOXYzine HCl (ATARAX) 10 MG Tab Take 1-2 tablets (10-20 mg total) by mouth 3 (three) times daily as needed (anxiety--- PLEASE INCLUDE ON RX, disregard other rx.).    montelukast (SINGULAIR) 10 mg tablet Take 1 tablet (10 mg total) by mouth once daily.           Clinical Reference Information           Your Vitals Were     Temp Height Weight BMI       98.1 °F (36.7 °C) 5' 0.75" (1.543 m) 49.2 kg (108 lb 7.5 oz) 20.66 kg/m2       Allergies as of 4/12/2017     Amlodipine    Benazepril    Dyazide  [Triamterene-hydrochlorothiazid]      Immunizations Administered on Date of Encounter - 4/12/2017     None      [facility] " Sign-Up     Activating your [facility] account is as easy as 1-2-3!     1) Visit [email or web address], select Sign Up Now, enter this activation code and your date of birth, then select Next.  [email or web address]  Expires: 5/27/2017  1:58 PM      2) Create a username and password to use when you visit [facility] in the future and select a security question in case you lose your password and select Next.    3) Enter your e-mail address and click Sign Up!    Additional Information  If you have questions, please e-mail [email or web address] or call [phone] to talk to our [facility] staff. Remember, [facility] is NOT to be used for urgent needs. For medical emergencies, dial 911.         Instructions    Per discussion.       Language Assistance Services     ATTENTION: Language assistance services are available, free of charge. Please call [phone].      ATENCIÓN: Si habla lucas, tiene a graves disposición servicios gratuitos de asistencia lingüística. Llame al [phone].     BETO Ý: N?u b?n nói Ti?ng Vi?t, có các d?ch v? h? tr? ngôn ng? mi?n phí dành cho b?n. G?i s? [phone].         [facility] complies with applicable Federal civil rights laws and does not discriminate on the basis of race, color, national origin, age, disability, or sex.

## (undated) DEVICE — SEE MEDLINE ITEM 157027

## (undated) DEVICE — DRESSING TRANS 4X4 TEGADERM

## (undated) DEVICE — SEE MEDLINE ITEM 157117

## (undated) DEVICE — EVACUATOR WOUND BULB 100CC

## (undated) DEVICE — COVER OVERHEAD SURG LT BLUE

## (undated) DEVICE — GLOVE PROTEXIS HYDROGEL SZ7.5

## (undated) DEVICE — SEE MEDLINE ITEM 152739

## (undated) DEVICE — DRESSING XEROFORM FOIL PK 1X8

## (undated) DEVICE — SHEET THYROID W/ISO-BAC

## (undated) DEVICE — SPONGE DERMACEA GAUZE 4X4

## (undated) DEVICE — SYR 10CC LUER LOCK

## (undated) DEVICE — ADHESIVE MASTISOL VIAL 48/BX

## (undated) DEVICE — SUPPORT ULNA NERVE PROTECTOR

## (undated) DEVICE — CLOSURE SKIN STERI STRIP 1/2X4

## (undated) DEVICE — GAUZE SPONGE 4X4 12PLY

## (undated) DEVICE — SHEARS HARMONIC CRVD 9 CM

## (undated) DEVICE — SOL 9P NACL IRR PIC IL

## (undated) DEVICE — SUT 2/0 30IN SILK BLK BRAI

## (undated) DEVICE — ELECTRODE REM PLYHSV RETURN 9

## (undated) DEVICE — NDL SAFETY 25G X 1.5 ECLIPSE

## (undated) DEVICE — MANIFOLD 4 PORT

## (undated) DEVICE — BRA MAMMARY SUPPORT MED

## (undated) DEVICE — APPLIER CLIP LIAGCLIP 9.375IN

## (undated) DEVICE — SUT VICRYL 3-0 27 SH

## (undated) DEVICE — SUT ETHILON 3/0 18IN PS-1

## (undated) DEVICE — SEE MEDLINE ITEM 152622

## (undated) DEVICE — SUT MONOCRYL 4.0 PS2 CP496G

## (undated) DEVICE — DRAIN FLAT JP 10X4MM P

## (undated) DEVICE — EVACUATOR PENCIL SMOKE NEPTUNE

## (undated) DEVICE — VEST SURGICAL SZ 2